# Patient Record
Sex: FEMALE | Race: WHITE | Employment: FULL TIME | URBAN - METROPOLITAN AREA
[De-identification: names, ages, dates, MRNs, and addresses within clinical notes are randomized per-mention and may not be internally consistent; named-entity substitution may affect disease eponyms.]

---

## 2017-08-03 ENCOUNTER — OFFICE VISIT (OUTPATIENT)
Dept: INTERNAL MEDICINE CLINIC | Age: 27
End: 2017-08-03

## 2017-08-03 VITALS
RESPIRATION RATE: 14 BRPM | HEART RATE: 77 BPM | TEMPERATURE: 98.7 F | BODY MASS INDEX: 30.66 KG/M2 | WEIGHT: 184 LBS | HEIGHT: 65 IN | SYSTOLIC BLOOD PRESSURE: 118 MMHG | DIASTOLIC BLOOD PRESSURE: 82 MMHG | OXYGEN SATURATION: 98 %

## 2017-08-03 DIAGNOSIS — F41.9 ANXIETY: Primary | ICD-10-CM

## 2017-08-03 DIAGNOSIS — Z79.899 ENCOUNTER FOR LONG-TERM (CURRENT) USE OF MEDICATIONS: ICD-10-CM

## 2017-08-03 PROBLEM — Z71.89 ADVANCE DIRECTIVE DISCUSSED WITH PATIENT: Chronic | Status: ACTIVE | Noted: 2017-08-03

## 2017-08-03 RX ORDER — SERTRALINE HYDROCHLORIDE 100 MG/1
100 TABLET, FILM COATED ORAL DAILY
Qty: 30 TAB | Refills: 5 | Status: SHIPPED | OUTPATIENT
Start: 2017-08-03 | End: 2018-02-21 | Stop reason: SDUPTHER

## 2017-08-03 RX ORDER — SERTRALINE HYDROCHLORIDE 50 MG/1
75 TABLET, FILM COATED ORAL DAILY
Qty: 45 TAB | Refills: 0 | Status: CANCELLED | OUTPATIENT
Start: 2017-08-03

## 2017-08-03 NOTE — PROGRESS NOTES
Chief Complaint   Patient presents with    Anxiety     follow up   [facility] Directive provided to pt today; will return to office upon completion. 1. Have you been to the ER, urgent care clinic since your last visit? Hospitalized since your last visit? No    2. Have you seen or consulted any other health care providers outside of the [address] [place] [address] since your last visit? Include any pap smears or colon screening. Yes, [facility].

## 2017-08-03 NOTE — ACP (ADVANCE CARE PLANNING)
-the [place] Advanced Directive for Healthcare template given to patient for review and completion. Patient asked to provide us with a copy once it is completed.

## 2017-08-03 NOTE — MR AVS SNAPSHOT
Visit Information Date & Time Provider Department Dept. Phone Encounter #  
 8/3/2017 12:00 PM [phone] Follow-up Instructions Return in about 2 weeks (around 8/17/2017). Upcoming Health Maintenance Date Due INFLUENZA AGE 9 TO ADULT 10/2/2017* DTaP/Tdap/Td series (2 - Td) 12/1/2019 PAP AKA CERVICAL CYTOLOGY 12/21/2019 *Topic was postponed. The date shown is not the original due date. Allergies as of 8/3/2017  Review Complete On: 8/3/2017 By: Shawna Garcia LPN No Known Allergies Current Immunizations  Never Reviewed Name Date Influenza Vaccine 11/21/2013 Tdap 12/1/2009 Not reviewed this visit You Were Diagnosed With   
  
 Codes Comments Anxiety     ICD-10-CM: F41.9 ICD-9-CM: 300.00 Advance directive discussed with patient     ICD-10-CM: Z71.89 ICD-9-CM: V65.49 Vitals BP Pulse Temp Resp Height(growth percentile) Weight(growth percentile) 118/82 (BP 1 Location: Left arm, BP Patient Position: Sitting) 77 98.7 °F (37.1 °C) (Oral) 14 5' 5\" (1.651 m) 184 lb (83.5 kg) LMP SpO2 Breastfeeding? BMI OB Status Smoking Status 07/26/2017 (Approximate) 98% No 30.62 kg/m2 Having regular periods Never Smoker Vitals History BMI and BSA Data Body Mass Index Body Surface Area  
 30.62 kg/m 2 1.96 m 2 [facility], [address] [place] [address] [phone] Your Updated Medication List  
  
   
This list is accurate as of: 8/3/17  1:20 PM.  Always use your most recent med list.  
  
  
  
  
 betamethasone dipropionate 0.05 % topical cream  
Commonly known as:  Noberto Mylar Apply  to affected area two (2) times a day. LEVORA-28 0.15-0.03 mg Tab Generic drug:  levonorgestrel-ethinyl estradiol TAKE ONE TABLET BY MOUTH EVERY DAY  
  
 sertraline 100 mg tablet Commonly known as:  ZOLOFT Take 1 Tab by mouth daily. Prescriptions Sent to Pharmacy Refills  
 sertraline (ZOLOFT) 100 mg tablet 5 Sig: Take 1 Tab by mouth daily. Class: Normal  
 Pharmacy: [facility] [address] [facility] #: [phone] Route: Oral  
  
Follow-up Instructions Return in about 2 weeks (around 8/17/2017). Introducing [facility] SERVICES! Dear Sue Reyes: 
Thank you for requesting a [email or web address] account. Our records indicate that you already have an active [email or web address] account. You can access your account anytime at [email or web address] Did you know that you can access your hospital and ER discharge instructions at any time in [email or web address]? You can also review all of your test results from your hospital stay or ER visit. Additional Information If you have questions, please visit the Frequently Asked Questions section of the [email or web address] website at [email or web address] Remember, [email or web address] is NOT to be used for urgent needs. For medical emergencies, dial 911. Now available from your iPhone and Android! Please provide this summary of care documentation to your next provider. Your primary care clinician is listed as Roseanne Mckenzie. If you have any questions after today's visit, please call [phone].

## 2017-08-03 NOTE — PROGRESS NOTES
Subjective:      Isabel Umanzor is a 28 y.o. female who presents today for follow up of her anxiety. She has been doing well on zoloft, her dose was increased from 50mg to 75mg daily in [address] [place] [address]. She states that her anxiety is in better control, but she still has some anhedonia. She is a  and she thought that without the stress of school, she would feel a little better. No significant change was noted over this summer. Patient Active Problem List   Diagnosis Code    Anxiety F41.9    Pap smear for cervical cancer screening Z12.4    Advance directive discussed with patient Z71.89     Current Outpatient Prescriptions   Medication Sig Dispense Refill    sertraline (ZOLOFT) 100 mg tablet Take 1 Tab by mouth daily. 30 Tab 5    LEVORA-28 0.15-0.03 mg tab TAKE ONE TABLET BY MOUTH EVERY DAY  2    betamethasone dipropionate (DIPROSONE) 0.05 % topical cream Apply  to affected area two (2) times a day. Review of Systems    Pertinent items are noted in HPI. Objective:     Visit Vitals    /82 (BP 1 Location: Left arm, BP Patient Position: Sitting)    Pulse 77    Temp 98.7 °F (37.1 °C) (Oral)    Resp 14    Ht 5' 5\" (1.651 m)    Wt 184 lb (83.5 kg)    LMP 07/26/2017 (Approximate)    SpO2 98%    Breastfeeding No    BMI 30.62 kg/m2     General appearance: alert, cooperative, no distress, appears stated age  Head: Normocephalic, without obvious abnormality, atraumatic  Neck: supple, symmetrical, trachea midline, no adenopathy, no carotid bruit and no JVD  Lungs: clear to auscultation bilaterally  Heart: regular rate and rhythm, S1, S2 normal, no murmur, click, rub or gallop  Neurologic: Mental status: Alert, oriented, thought content appropriate, affect: stable    Assessment/Plan:     1. Anxiety  -will increase her zololft from 75mg to 100mg daily. Orders Placed This Encounter    sertraline (ZOLOFT) 100 mg tablet     Sig: Take 1 Tab by mouth daily. Dispense:  30 Tab     Refill:  5        Follow-up Disposition:     Follow up in 2 weeks    Return if symptoms worsen or fail to improve. Advised patient to call back or return to office if symptoms worsen/change/persist.     Discussed expected course/resolution/complications of diagnosis in detail with patient. Medication risks/benefits/costs/interactions/alternatives discussed with patient. Patient was given an after visit summary which includes diagnoses, current medications, & vitals. Patient expressed understanding with the diagnosis and plan.

## 2017-08-21 ENCOUNTER — OFFICE VISIT (OUTPATIENT)
Dept: INTERNAL MEDICINE CLINIC | Age: 27
End: 2017-08-21

## 2017-08-21 VITALS
HEART RATE: 69 BPM | HEIGHT: 67 IN | OXYGEN SATURATION: 98 % | DIASTOLIC BLOOD PRESSURE: 80 MMHG | SYSTOLIC BLOOD PRESSURE: 120 MMHG | TEMPERATURE: 99 F | WEIGHT: 186 LBS | BODY MASS INDEX: 29.19 KG/M2

## 2017-08-21 DIAGNOSIS — Z79.899 ENCOUNTER FOR LONG-TERM (CURRENT) USE OF MEDICATIONS: ICD-10-CM

## 2017-08-21 DIAGNOSIS — F41.8 ANXIETY WITH DEPRESSION: Primary | ICD-10-CM

## 2017-08-21 RX ORDER — SERTRALINE HYDROCHLORIDE 50 MG/1
TABLET, FILM COATED ORAL
COMMUNITY
Start: 2017-07-17 | End: 2017-08-21 | Stop reason: DRUGHIGH

## 2017-08-21 NOTE — MR AVS SNAPSHOT
Visit Information Date & Time Provider Department Dept. Phone Encounter #  
 8/21/2017 11:00 AM MD Jaime [facility] [phone] Follow-up Instructions Return in about 4 months (around 12/21/2017). Upcoming Health Maintenance Date Due INFLUENZA AGE 9 TO ADULT 10/2/2017* DTaP/Tdap/Td series (2 - Td) 12/1/2019 PAP AKA CERVICAL CYTOLOGY 12/21/2019 *Topic was postponed. The date shown is not the original due date. Allergies as of 8/21/2017  Review Complete On: 8/21/2017 By: Eleni Raines LPN No Known Allergies Current Immunizations  Never Reviewed Name Date Influenza Vaccine 11/21/2013 Tdap 12/1/2009 Not reviewed this visit Vitals BP Pulse Temp Height(growth percentile) Weight(growth percentile) 120/80 (BP 1 Location: Left arm, BP Patient Position: Sitting) 69 99 °F (37.2 °C) (Oral) 5' 6.75\" (1.695 m) 186 lb (84.4 kg) LMP SpO2 BMI OB Status Smoking Status 07/26/2017 (Approximate) 98% 29.35 kg/m2 Having regular periods Never Smoker Vitals History BMI and BSA Data Body Mass Index Body Surface Area  
 29.35 kg/m 2 1.99 m 2 Preferred Pharmacy Pharmacy Name Phone Sarah Leonard [address] , [address] [place] [address] [phone] Your Updated Medication List  
  
   
This list is accurate as of: 8/21/17 11:34 AM.  Always use your most recent med list.  
  
  
  
  
 betamethasone dipropionate 0.05 % topical cream  
Commonly known as:  Alvenia Fede Apply  to affected area two (2) times a day. LEVORA-28 0.15-0.03 mg Tab Generic drug:  levonorgestrel-ethinyl estradiol TAKE ONE TABLET BY MOUTH EVERY DAY  
  
 sertraline 100 mg tablet Commonly known as:  ZOLOFT Take 1 Tab by mouth daily. Follow-up Instructions Return in about 4 months (around 12/21/2017). Introducing [facility] & HEALTH SERVICES! Dear Sue Reyes: Thank you for requesting a [employer] account. Our records indicate that you already have an active [employer] account. You can access your account anytime at [email or web address]. [email or web address] Did you know that you can access your hospital and ER discharge instructions at any time in [employer]? You can also review all of your test results from your hospital stay or ER visit. Additional Information If you have questions, please visit the Frequently Asked Questions section of the [employer] website at [email or web address] Remember, [employer] is NOT to be used for urgent needs. For medical emergencies, dial 911. Now available from your iPhone and Android! Please provide this summary of care documentation to your next provider. Your primary care clinician is listed as Roseanne Mckenzie. If you have any questions after today's visit, please call [phone].

## 2017-08-21 NOTE — PROGRESS NOTES
Subjective:      Deshawn Adams is a 28 y.o. female who presents today for follow up of her anxiety with depression. She states that she isn't sure if the dose increase of her zoloft from 75mg to 100mg daily has made a difference in the last 2 weeks since dose change. She has had some difficulties with sleep. Falls asleep without any problems, but will wake around midnight or 1 am and not able to fall back asleep. She is taking her zoloft in the morning. She is a  and is due back at school in one week. Patient Active Problem List   Diagnosis Code    Anxiety F41.9    Pap smear for cervical cancer screening Z12.4    Advance directive discussed with patient Z71.89     Current Outpatient Prescriptions   Medication Sig Dispense Refill    sertraline (ZOLOFT) 100 mg tablet Take 1 Tab by mouth daily. 30 Tab 5    LEVORA-28 0.15-0.03 mg tab TAKE ONE TABLET BY MOUTH EVERY DAY  2    betamethasone dipropionate (DIPROSONE) 0.05 % topical cream Apply  to affected area two (2) times a day. Review of Systems    Pertinent items are noted in HPI. Objective:     Visit Vitals    /80 (BP 1 Location: Left arm, BP Patient Position: Sitting)    Pulse 69    Temp 99 °F (37.2 °C) (Oral)    Ht 5' 6.75\" (1.695 m)    Wt 186 lb (84.4 kg)    LMP 07/26/2017 (Approximate)    SpO2 98%    BMI 29.35 kg/m2     General appearance: alert, cooperative, no distress, appears stated age  Neurologic: Mental status: Alert, oriented, thought content appropriate, affect: stable    Assessment/Plan:     1. Anxiety with depression  -since it has only been 2 weeks, will continue on zoloft 100mg daily. Her anxiety is under good control, she just still has some anhedonia.  -if in the next 3-4 weeks, she continues to not improve. Will change her medication at that time.  -she will keep me posted via [employer].     2. Encounter for long-term (current) use of medications       Follow-up Disposition:     Follow up 4 months      Return if symptoms worsen or fail to improve. Advised patient to call back or return to office if symptoms worsen/change/persist.     Discussed expected course/resolution/complications of diagnosis in detail with patient. Medication risks/benefits/costs/interactions/alternatives discussed with patient. Patient was given an after visit summary which includes diagnoses, current medications, & vitals. Patient expressed understanding with the diagnosis and plan.

## 2017-08-21 NOTE — PROGRESS NOTES
Chief Complaint   Patient presents with    Anxiety     2 week follow up     1. Have you been to the ER, urgent care clinic since your last visit? Hospitalized since your last visit? No    2. Have you seen or consulted any other health care providers outside of the [address] [place] [address] since your last visit? Include any pap smears or colon screening.  No

## 2018-02-26 ENCOUNTER — OFFICE VISIT (OUTPATIENT)
Dept: INTERNAL MEDICINE CLINIC | Age: 28
End: 2018-02-26

## 2018-02-26 VITALS
HEIGHT: 67 IN | WEIGHT: 187.4 LBS | BODY MASS INDEX: 29.41 KG/M2 | OXYGEN SATURATION: 97 % | SYSTOLIC BLOOD PRESSURE: 118 MMHG | TEMPERATURE: 99.3 F | DIASTOLIC BLOOD PRESSURE: 82 MMHG | HEART RATE: 67 BPM | RESPIRATION RATE: 15 BRPM

## 2018-02-26 DIAGNOSIS — Z79.899 ENCOUNTER FOR LONG-TERM (CURRENT) USE OF MEDICATIONS: ICD-10-CM

## 2018-02-26 DIAGNOSIS — F41.8 ANXIETY WITH DEPRESSION: Primary | ICD-10-CM

## 2018-02-26 NOTE — MR AVS SNAPSHOT
[facility], [facility] [address] [address] 
[phone] Patient: Woo Nicole MRN: [id number] :1990 Visit Information Date & Time Provider Department Dept. Phone Encounter #  
 2018 11:00 [facility] [phone] [id number] Follow-up Instructions Return in about 1 year (around 2019) for anxiety. Upcoming Health Maintenance Date Due DTaP/Tdap/Td series (2 - Td) 2019 PAP AKA CERVICAL CYTOLOGY 2019 Allergies as of 2018  Review Complete On: 2018 By: Raúl Thomas LPN No Known Allergies Current Immunizations  Never Reviewed Name Date Influenza Vaccine 2013 Tdap 2009 Not reviewed this visit You Were Diagnosed With   
  
 Codes Comments Anxiety with depression    -  Primary ICD-10-CM: F41.8 ICD-9-CM: 300.4 Encounter for long-term (current) use of medications     ICD-10-CM: Z79.899 ICD-9-CM: V58.69 Vitals BP Pulse Temp Resp Height(growth percentile) Weight(growth percentile) 118/82 (BP 1 Location: Left arm, BP Patient Position: Sitting) 67 99.3 °F (37.4 °C) (Oral) 15 5' 6.75\" (1.695 m) 187 lb 6.4 oz (85 kg) LMP SpO2 Breastfeeding? BMI OB Status Smoking Status 2018 (Exact Date) 97% No 29.57 kg/m2 Having regular periods Never Smoker Vitals History BMI and BSA Data Body Mass Index Body Surface Area  
 29.57 kg/m 2 2 m 2 Preferred Pharmacy Pharmacy Name Phone Wilmer Orta [address] [place] [address] [phone] Your Updated Medication List  
  
   
This list is accurate as of 18 11:38 AM.  Always use your most recent med list.  
  
  
  
  
 betamethasone dipropionate 0.05 % topical cream  
Commonly known as:  Lorita Donning Apply  to affected area two (2) times a day. LEVORA-28 0.15-0.03 mg Tab Generic drug:  levonorgestrel-ethinyl estradiol TAKE ONE TABLET BY MOUTH EVERY DAY  
  
 sertraline 100 mg tablet Commonly known as:  ZOLOFT  
TAKE ONE TABLET BY MOUTH DAILY Follow-up Instructions Return in about 1 year (around 2/26/2019) for anxiety. Introducing [facility] & HEALTH SERVICES! Dear Kalia North: 
Thank you for requesting a [employer] account. Our records indicate that you already have an active [employer] account. You can access your account anytime at [email or web address] Did you know that you can access your hospital and ER discharge instructions at any time in [employer]? You can also review all of your test results from your hospital stay or ER visit. Additional Information If you have questions, please visit the Frequently Asked Questions section of the [employer] website at [email or web address] Remember, [employer] is NOT to be used for urgent needs. For medical emergencies, dial 911. Now available from your iPhone and Android! Please provide this summary of care documentation to your next provider. Your primary care clinician is listed as Roseanne Mckenzie. If you have any questions after today's visit, please call [phone].

## 2018-02-26 NOTE — PROGRESS NOTES
Chief Complaint   Patient presents with    Anxiety     6 month follow up     1. Have you been to the ER, urgent care clinic since your last visit? Hospitalized since your last visit? No    2. Have you seen or consulted any other health care providers outside of the [address] [place] [address] since your last visit? Include any pap smears or colon screening.  [facility] 12/2017

## 2018-02-27 NOTE — PROGRESS NOTES
Subjective:      Conception Raul is a 28 y.o. female who presents today for follow up of her anxiety. She is taking zoloft 100mg daily. States that she is doing quite well. Work is good. Her daughter is doing well. Patient Active Problem List   Diagnosis Code    Anxiety F41.9    Pap smear for cervical cancer screening Z12.4    Advance directive discussed with patient Z71.89     Current Outpatient Prescriptions   Medication Sig Dispense Refill    sertraline (ZOLOFT) 100 mg tablet TAKE ONE TABLET BY MOUTH DAILY 30 Tab 0    LEVORA-28 0.15-0.03 mg tab TAKE ONE TABLET BY MOUTH EVERY DAY  2    betamethasone dipropionate (DIPROSONE) 0.05 % topical cream Apply  to affected area two (2) times a day. Review of Systems    Pertinent items are noted in HPI. Objective:     Visit Vitals    /82 (BP 1 Location: Left arm, BP Patient Position: Sitting)    Pulse 67    Temp 99.3 °F (37.4 °C) (Oral)    Resp 15    Ht 5' 6.75\" (1.695 m)    Wt 187 lb 6.4 oz (85 kg)    LMP 02/21/2018 (Exact Date)    SpO2 97%    Breastfeeding No    BMI 29.57 kg/m2     General appearance: alert, cooperative, no distress, appears stated age  Head: Normocephalic, without obvious abnormality, atraumatic  Lungs: clear to auscultation bilaterally  Heart: regular rate and rhythm, S1, S2 normal, no murmur, click, rub or gallop  Neurologic: Mental status: Alert, oriented, thought content appropriate, affect: stable and normal    Assessment/Plan:     1. Anxiety with depression  -I evaluated and recommended to continue current doses of medications.   -consider weaning next spring. 2. Encounter for long-term (current) use of medications       Follow-up Disposition:     Follow up in 1 month year. Return if symptoms worsen or fail to improve. Advised patient to call back or return to office if symptoms worsen/change/persist.     Discussed expected course/resolution/complications of diagnosis in detail with patient. Medication risks/benefits/costs/interactions/alternatives discussed with patient. Patient was given an after visit summary which includes diagnoses, current medications, & vitals. Patient expressed understanding with the diagnosis and plan.

## 2018-11-26 ENCOUNTER — OFFICE VISIT (OUTPATIENT)
Dept: INTERNAL MEDICINE CLINIC | Age: 28
End: 2018-11-26

## 2018-11-26 VITALS
WEIGHT: 196 LBS | BODY MASS INDEX: 30.76 KG/M2 | OXYGEN SATURATION: 98 % | TEMPERATURE: 98.1 F | HEIGHT: 67 IN | RESPIRATION RATE: 15 BRPM | SYSTOLIC BLOOD PRESSURE: 108 MMHG | HEART RATE: 62 BPM | DIASTOLIC BLOOD PRESSURE: 60 MMHG

## 2018-11-26 DIAGNOSIS — F41.8 ANXIETY WITH DEPRESSION: Primary | ICD-10-CM

## 2018-11-26 DIAGNOSIS — Z79.899 ENCOUNTER FOR LONG-TERM (CURRENT) USE OF MEDICATIONS: ICD-10-CM

## 2018-11-26 RX ORDER — CITALOPRAM 20 MG/1
20 TABLET, FILM COATED ORAL DAILY
Qty: 30 TAB | Refills: 1 | Status: SHIPPED | OUTPATIENT
Start: 2018-11-26 | End: 2019-01-24 | Stop reason: SDUPTHER

## 2018-11-26 RX ORDER — SERTRALINE HYDROCHLORIDE 25 MG/1
25 TABLET, FILM COATED ORAL DAILY
Qty: 7 TAB | Refills: 0 | Status: SHIPPED | OUTPATIENT
Start: 2018-11-26 | End: 2018-12-27 | Stop reason: ALTCHOICE

## 2018-11-26 NOTE — PROGRESS NOTES
Patient's identity verified with two patient identifiers (name and date of birth). Reviewed record in preparation for visit and have obtained necessary documentation. 1. Have you been to the ER, urgent care clinic since your last visit? Hospitalized since your last visit? No 
2. Have you seen or consulted any other health care providers outside of the [address] [place] [address] since your last visit? Include any pap smears or colon screening. No 
 
Chief Complaint Patient presents with  Medication Evaluation Requesting refill of Zoloft, but states \"it's not working\", would like to talk about alternatives. Has increased dose everytime she comes in & declines doing that today. Has been on Celexa in the past before pregnancy x4yrs ago. Not fasting. Health Maintenance Due Topic Date Due  Influenza Age 5 to Adult Done per pt, x2wks ago. 08/01/2018 Wt Readings from Last 3 Encounters:  
11/26/18 196 lb (88.9 kg) 02/26/18 187 lb 6.4 oz (85 kg) 08/21/17 186 lb (84.4 kg) Temp Readings from Last 3 Encounters:  
11/26/18 98.1 °F (36.7 °C) (Oral) 02/26/18 99.3 °F (37.4 °C) (Oral) 08/21/17 99 °F (37.2 °C) (Oral) BP Readings from Last 3 Encounters:  
11/26/18 108/60  
02/26/18 118/82  
08/21/17 120/80 Pulse Readings from Last 3 Encounters:  
11/26/18 62  
02/26/18 67  
08/21/17 69

## 2018-11-26 NOTE — PROGRESS NOTES
Subjective:  
  
Sherryle Hutchinson is a 33 y.o. female who presents today for follow up of her anxiety. She states that she doesn't feel the zoloft is working any more. She is having some episodes of feeling sad. She is currently in a job that she does not like. 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 She has used celexa in the past - high school until she became pregnant , when she was switched to zoloft post partum. She and her  are thinking about conceiving again as well. Patient Active Problem List  
Diagnosis Code  Anxiety F41.9  Pap smear for cervical cancer screening Z12.4  Advance directive discussed with patient Z70.80 Current Outpatient Medications Medication Sig Dispense Refill  sertraline (ZOLOFT) 25 mg tablet Take 1 Tab by mouth daily. 7 Tab 0  
 citalopram (CELEXA) 20 mg tablet Take 1 Tab by mouth daily. 30 Tab 1  LEVORA-28 0.15-0.03 mg tab TAKE ONE TABLET BY MOUTH EVERY DAY  2  
 betamethasone dipropionate (DIPROSONE) 0.05 % topical cream Apply  to affected area two (2) times a day. Review of Systems Pertinent items are noted in HPI. Objective:  
 
Visit Vitals /60 (BP 1 Location: Left arm, BP Patient Position: Sitting) Pulse 62 Temp 98.1 °F (36.7 °C) (Oral) Resp 15 Ht 5' 6.75\" (1.695 m) Wt 196 lb (88.9 kg) LMP 11/05/2018 (Within Days) SpO2 98% Breastfeeding? No  
BMI 30.93 kg/m² General appearance: alert, cooperative, no distress, appears stated age Head: Normocephalic, without obvious abnormality, atraumatic Neck: supple, symmetrical, trachea midline, no adenopathy, no carotid bruit and no JVD Lungs: clear to auscultation bilaterally Heart: regular rate and rhythm, S1, S2 normal, no murmur, click, rub or gallop Neurologic: Mental status: Alert, oriented, thought content appropriate, affect: stable and normal 
 
Assessment/Plan: 1. Anxiety with depression -will wean her off of her zoloft at this time.  
-titrate up celexa to 20mg daily. 
-instructions given to patient. 
-recommended counseling to help her cope with a job that she does not like that she has to stay in for the next 7 months. 2. Encounter for long-term (current) use of medications Orders Placed This Encounter  sertraline (ZOLOFT) 25 mg tablet Sig: Take 1 Tab by mouth daily. Dispense:  7 Tab Refill:  0  
 citalopram (CELEXA) 20 mg tablet Sig: Take 1 Tab by mouth daily. Dispense:  30 Tab Refill:  1 Follow-up Disposition:  
 
follow up 4 weeks. Return if symptoms worsen or fail to improve. Advised patient to call back or return to office if symptoms worsen/change/persist.  
 
Discussed expected course/resolution/complications of diagnosis in detail with patient. Medication risks/benefits/costs/interactions/alternatives discussed with patient. Patient was given an after visit summary which includes diagnoses, current medications, & vitals. Patient expressed understanding with the diagnosis and plan.

## 2018-11-26 NOTE — PATIENT INSTRUCTIONS
Days 1-7 zoloft 50mg in the AM 
                celexa 10mg in the evening Day 8-14  Zoloft 25 mg in the morning Celexa 20mg in the evening Day 15-21  Stop the zoloft Celexa 20mg daily.

## 2018-12-27 ENCOUNTER — OFFICE VISIT (OUTPATIENT)
Dept: INTERNAL MEDICINE CLINIC | Age: 28
End: 2018-12-27

## 2018-12-27 VITALS
DIASTOLIC BLOOD PRESSURE: 78 MMHG | HEART RATE: 75 BPM | HEIGHT: 67 IN | WEIGHT: 199 LBS | OXYGEN SATURATION: 98 % | SYSTOLIC BLOOD PRESSURE: 112 MMHG | RESPIRATION RATE: 15 BRPM | BODY MASS INDEX: 31.23 KG/M2 | TEMPERATURE: 99.2 F

## 2018-12-27 DIAGNOSIS — F41.8 DEPRESSION WITH ANXIETY: Primary | ICD-10-CM

## 2018-12-27 DIAGNOSIS — Z79.899 ENCOUNTER FOR LONG-TERM (CURRENT) USE OF MEDICATIONS: ICD-10-CM

## 2018-12-27 RX ORDER — BUPROPION HYDROCHLORIDE 150 MG/1
150 TABLET ORAL
Qty: 30 TAB | Refills: 0 | Status: SHIPPED | OUTPATIENT
Start: 2018-12-27 | End: 2019-01-24 | Stop reason: SDUPTHER

## 2018-12-27 NOTE — PROGRESS NOTES
Patient's identity verified with two patient identifiers (name and date of birth). Reviewed record in preparation for visit and have obtained necessary documentation. 1. Have you been to the ER, urgent care clinic since your last visit? Hospitalized since your last visit? No  2. Have you seen or consulted any other health care providers outside of the [address] [place] [address] since your last visit? Include any pap smears or colon screening. No    Chief Complaint   Patient presents with    Depression     4wk follow up, stopped Zoloft, started Celexa. Doesn't feel like the Celexa is working, has been on x2wks, takes in AM.  Complains of feeling \"jittery & tense, on edge\". States  thinks she is more fatigued, she feels rested. Not fasting. There are no preventive care reminders to display for this patient.     Wt Readings from Last 3 Encounters:   12/27/18 199 lb (90.3 kg)   11/26/18 196 lb (88.9 kg)   02/26/18 187 lb 6.4 oz (85 kg)     Temp Readings from Last 3 Encounters:   12/27/18 99.2 °F (37.3 °C) (Oral)   11/26/18 98.1 °F (36.7 °C) (Oral)   02/26/18 99.3 °F (37.4 °C) (Oral)     BP Readings from Last 3 Encounters:   12/27/18 112/78   11/26/18 108/60   02/26/18 118/82     Pulse Readings from Last 3 Encounters:   12/27/18 75   11/26/18 62   02/26/18 67

## 2018-12-27 NOTE — PROGRESS NOTES
Subjective:      Sofi Pérez is a 33 y.o. female who presents today for follow up of her depression with anxiety. She was weaned off of zoloft which she felt was no longer effective and started on celexa 20mg daily 1 month ago. She is still feeling gould, edgy and her anxiety is not well controlled. She has been on celexa on its own for 2 weeks now. Her mother is taking wellbutrin for depression without anxiety and it works well for her. Patient Active Problem List   Diagnosis Code    Anxiety F41.9    Pap smear for cervical cancer screening Z12.4    Advance directive discussed with patient Z71.89     Current Outpatient Medications   Medication Sig Dispense Refill    buPROPion XL (WELLBUTRIN XL) 150 mg tablet Take 1 Tab by mouth every morning. 30 Tab 0    citalopram (CELEXA) 20 mg tablet Take 1 Tab by mouth daily. 30 Tab 1    LEVORA-28 0.15-0.03 mg tab TAKE ONE TABLET BY MOUTH EVERY DAY  2    betamethasone dipropionate (DIPROSONE) 0.05 % topical cream Apply  to affected area two (2) times a day. Review of Systems    Pertinent items are noted in HPI. Objective:     Visit Vitals  /78 (BP 1 Location: Left arm, BP Patient Position: Sitting)   Pulse 75   Temp 99.2 °F (37.3 °C) (Oral)   Resp 15   Ht 5' 6.75\" (1.695 m)   Wt 199 lb (90.3 kg)   LMP 12/12/2018 (Within Days)   SpO2 98%   Breastfeeding? No   BMI 31.40 kg/m²     General appearance: alert, cooperative, no distress, appears stated age  Head: Normocephalic, without obvious abnormality, atraumatic  Lungs: clear to auscultation bilaterally  Heart: regular rate and rhythm, S1, S2 normal, no murmur, click, rub or gallop  Neurologic: Mental status: Alert, oriented, thought content appropriate, affect: stable and normal    Assessment/Plan:     1. Depression with anxiety  -discussed with patient that I will  add wellbutrin to her celexa to help control her symptoms.      2. Encounter for long-term (current) use of medications    Orders Placed This Encounter    buPROPion XL (WELLBUTRIN XL) 150 mg tablet     Sig: Take 1 Tab by mouth every morning. Dispense:  30 Tab     Refill:  0         Follow-up Disposition:     follow up 2-3 weeks. Return if symptoms worsen or fail to improve. Advised patient to call back or return to office if symptoms worsen/change/persist.     Discussed expected course/resolution/complications of diagnosis in detail with patient. Medication risks/benefits/costs/interactions/alternatives discussed with patient. Patient was given an after visit summary which includes diagnoses, current medications, & vitals. Patient expressed understanding with the diagnosis and plan.

## 2019-01-11 ENCOUNTER — OFFICE VISIT (OUTPATIENT)
Dept: INTERNAL MEDICINE CLINIC | Age: 29
End: 2019-01-11

## 2019-01-11 VITALS
BODY MASS INDEX: 31.2 KG/M2 | WEIGHT: 198.8 LBS | SYSTOLIC BLOOD PRESSURE: 100 MMHG | RESPIRATION RATE: 18 BRPM | DIASTOLIC BLOOD PRESSURE: 68 MMHG | HEIGHT: 67 IN | HEART RATE: 63 BPM | OXYGEN SATURATION: 98 % | TEMPERATURE: 98.7 F

## 2019-01-11 DIAGNOSIS — Z79.899 ENCOUNTER FOR LONG-TERM (CURRENT) USE OF MEDICATIONS: ICD-10-CM

## 2019-01-11 DIAGNOSIS — R53.83 FATIGUE, UNSPECIFIED TYPE: ICD-10-CM

## 2019-01-11 DIAGNOSIS — F41.8 DEPRESSION WITH ANXIETY: Primary | ICD-10-CM

## 2019-01-11 NOTE — PROGRESS NOTES
Reviewed record in preparation for visit and have obtained necessary documentation. Identified pt with two pt identifiers(name and ). There are no preventive care reminders to display for this patient. Chief Complaint Patient presents with  Depression  Medication Evaluation 2 wk f/u Wt Readings from Last 3 Encounters:  
19 198 lb 12.8 oz (90.2 kg) 18 199 lb (90.3 kg)  
18 196 lb (88.9 kg) Temp Readings from Last 3 Encounters:  
18 99.2 °F (37.3 °C) (Oral)  
18 98.1 °F (36.7 °C) (Oral) 18 99.3 °F (37.4 °C) (Oral) BP Readings from Last 3 Encounters:  
18 112/78  
18 108/60  
18 118/82 Pulse Readings from Last 3 Encounters:  
18 75  
18 62  
18 67 Learning Assessment: 
:  
 
Learning Assessment 2019 2018 8/3/2017 2015 12/15/2014 2014 PRIMARY LEARNER Patient Patient Patient Patient Patient Patient HIGHEST LEVEL OF EDUCATION - PRIMARY LEARNER  > 4 YEARS OF COLLEGE > 4 YEARS OF COLLEGE 4 YEARS OF COLLEGE > 4 YEARS OF COLLEGE > 4 YEARS OF COLLEGE > 4 YEARS OF COLLEGE  
BARRIERS PRIMARY LEARNER NONE NONE NONE NONE NONE NONE  
CO-LEARNER CAREGIVER - No - No No No  
PRIMARY LANGUAGE ENGLISH ENGLISH ENGLISH ENGLISH ENGLISH ENGLISH  NEED - - - - No No  
LEARNER PREFERENCE PRIMARY DEMONSTRATION READING READING DEMONSTRATION VIDEOS DEMONSTRATION  
LEARNING SPECIAL TOPICS - - - - no no ANSWERED BY patient patient self self patient patient RELATIONSHIP SELF SELF SELF SELF SELF SELF Depression Screening: 
:  
 
PHQ over the last two weeks 2019 PHQ Not Done - Little interest or pleasure in doing things Several days Feeling down, depressed, irritable, or hopeless More than half the days Total Score PHQ 2 3 Fall Risk Assessment: 
:  
 
Fall Risk Assessment, last 12 mths 2019 Able to walk? Yes Fall in past 12 months?  No  
 
 
 Abuse Screening: 
:  
 
Abuse Screening Questionnaire 1/11/2019 11/26/2018 8/21/2017 8/3/2017 12/7/2015 12/15/2014 Do you ever feel afraid of your partner? N N N N N N Are you in a relationship with someone who physically or mentally threatens you? N N N N N N Is it safe for you to go home? Jason Malloy Coordination of Care Questionnaire: 
:  
 
1) Have you been to an emergency room, urgent care clinic since your last visit? no  
Hospitalized since your last visit? no          
 
2) Have you seen or consulted any other health care providers outside of [address] [place] [address] since your last visit? no  (Include any pap smears or colon screenings in this section.) 3) Do you have an Advance Directive on file? no 
 
4) Are you interested in receiving information on Advance Directives? NO Patient is accompanied by self I have received verbal consent from Aline Valera to discuss any/all medical information while they are present in the room.

## 2019-01-11 NOTE — PROGRESS NOTES
Subjective:  
  
La Rojas is a 33 y.o. female who presents today for follow up of her depression with anxiety. wellbutrin was started 1 month ago in addition to her celexa. She was still having breakthrough depression at that time. She states that she may be a little better, less mood swing. She continues to feel very tired. She has always needed \"a lot\" of sleep per patient, but she can go to bed at 8:30p and sleep until morning. She is tired by early afternoon. She has always been a morning person, but by early afternoon she is tired. She snores occasionally. Patient Active Problem List  
Diagnosis Code  Anxiety F41.9  Pap smear for cervical cancer screening Z12.4  Advance directive discussed with patient Z70.80 Current Outpatient Medications Medication Sig Dispense Refill  buPROPion XL (WELLBUTRIN XL) 150 mg tablet Take 1 Tab by mouth every morning. 30 Tab 0  
 citalopram (CELEXA) 20 mg tablet Take 1 Tab by mouth daily. 30 Tab 1  LEVORA-28 0.15-0.03 mg tab TAKE ONE TABLET BY MOUTH EVERY DAY  2  
 betamethasone dipropionate (DIPROSONE) 0.05 % topical cream Apply  to affected area two (2) times a day. Review of Systems Pertinent items are noted in HPI. Objective:  
 
Visit Vitals /68 (BP 1 Location: Left arm, BP Patient Position: Sitting) Pulse 63 Temp 98.7 °F (37.1 °C) (Oral) Resp 18 Ht 5' 6.5\" (1.689 m) Wt 198 lb 12.8 oz (90.2 kg) LMP 12/12/2018 (Within Days) SpO2 98% BMI 31.61 kg/m² General appearance: alert, cooperative, no distress, appears stated age Head: Normocephalic, without obvious abnormality, atraumatic Neurologic: Mental status: Alert, oriented, thought content appropriate, affect: stable and normal 
 
Assessment/Plan: ICD-10-CM ICD-9-CM 1. Depression with anxiety F41.8 300.4 TSH 3RD GENERATION  
   T4, FREE 2.  Encounter for long-term (current) use of medications Z79.899 V58.69 CBC WITH AUTOMATED DIFF  
   METABOLIC PANEL, COMPREHENSIVE  
   HEMOGLOBIN A1C WITH EAG  
   TSH 3RD GENERATION  
   T4, FREE 3. Fatigue, unspecified type R53.83 780.79 CBC WITH AUTOMATED DIFF  
   METABOLIC PANEL, COMPREHENSIVE  
   HEMOGLOBIN A1C WITH EAG  
   TSH 3RD GENERATION  
   T4, FREE  
   SLEEP MEDICINE REFERRAL Plan: 
-check labs 
-discussed with patient that sleep disturbance can contribute to daily fatigue and her depression and anxiety. -recommended sleep study. Follow-up Disposition:  
 
Follow up in 6 months Return if symptoms worsen or fail to improve. Advised patient to call back or return to office if symptoms worsen/change/persist.  
 
Discussed expected course/resolution/complications of diagnosis in detail with patient. Medication risks/benefits/costs/interactions/alternatives discussed with patient. Patient was given an after visit summary which includes diagnoses, current medications, & vitals. Patient expressed understanding with the diagnosis and plan.

## 2019-01-16 ENCOUNTER — PATIENT MESSAGE (OUTPATIENT)
Dept: INTERNAL MEDICINE CLINIC | Age: 29
End: 2019-01-16

## 2019-01-16 NOTE — TELEPHONE ENCOUNTER
From: Angella Overton  To: Jane Kwok MD  Sent: 1/16/2019 2:38 PM EST  Subject: Visit Follow-Up Question    I haven't heard anything from the practice that deals with the sleep apnea stuff. I know you said to reach out if I didn't hear from them by mid-week, so was wondering if you could follow up on that for me or give me their contact info and I can reach out to them?

## 2019-01-16 NOTE — TELEPHONE ENCOUNTER
From  Arnel Macario MD To  Sarita Kunz Sent  1/16/2019  4:06 PM   I referred her to Dr. Sarai Tinsley you just give them a call and see if they got the referral.     Thanks,   Richard Farah   Physician   Specialty   Sleep Medicine     Phone Fax E-mail Address   [phone] Not available [address] [facility] [address]     Call to Dr. Catrina Garcia office but was on hold x2 for x9mins each with no answer. Information provided to patient to phone office for appointment.

## 2019-04-25 ENCOUNTER — OFFICE VISIT (OUTPATIENT)
Dept: SLEEP MEDICINE | Age: 29
End: 2019-04-25

## 2019-04-25 VITALS
BODY MASS INDEX: 30.69 KG/M2 | SYSTOLIC BLOOD PRESSURE: 100 MMHG | HEART RATE: 77 BPM | OXYGEN SATURATION: 97 % | DIASTOLIC BLOOD PRESSURE: 70 MMHG | WEIGHT: 195.5 LBS | HEIGHT: 67 IN

## 2019-04-25 DIAGNOSIS — G47.33 OSA (OBSTRUCTIVE SLEEP APNEA): Primary | ICD-10-CM

## 2019-04-25 DIAGNOSIS — Z86.59 H/O ANXIETY DISORDER: ICD-10-CM

## 2019-04-25 NOTE — PATIENT INSTRUCTIONS
[address] [facility] [place]. [place], [address]  Tel.  [phone]  Fax. [address] [facility] [address]  [place], [address] [facility]  Tel.  [phone]  Fax. [phone] [address]  Tel.  [phone]  Fax. [phone]     Sleep Apnea: After Your Visit  Your Care Instructions  Sleep apnea occurs when you frequently stop breathing for 10 seconds or longer during sleep. It can be mild to severe, based on the number of times per hour that you stop breathing or have slowed breathing. Blocked or narrowed airways in your nose, mouth, or throat can cause sleep apnea. Your airway can become blocked when your throat muscles and tongue relax during sleep. Sleep apnea is common, occurring in 1 out of 20 individuals. Individuals having any of the following characteristics should be evaluated and treated right away due to high risk and detrimental consequences from untreated sleep apnea:  1. Obesity  2. Congestive Heart failure  3. Atrial Fibrillation  4. Uncontrolled Hypertension  5. Type II Diabetes  6. Night-time Arrhythmias  7. Stroke  8. Pulmonary Hypertension  9. High-risk Driving Populations (pilots, truck drivers, etc.)  10. Patients Considering Weight-loss Surgery    How do you know you have sleep apnea? You probably have sleep apnea if you answer 'yes' to 3 or more of the following questions:  S - Have you been told that you Snore? T - Are you often Tired during the day? O - Has anyone Observed you stop breathing while sleeping? P- Do you have (or are being treated for) high blood Pressure? B - Are you obese (Body Mass Index > 35)? A - Is your Age 48years old or older? N - Is your Neck size greater than 16 inches? G - Are you male Gender? A sleep physician can prescribe a breathing device that prevents tissues in the throat from blocking your airway.  Or your doctor may recommend using a dental device (oral breathing device) to help keep your airway open. In some cases, surgery may be needed to remove enlarged tissues in the throat. Follow-up care is a key part of your treatment and safety. Be sure to make and go to all appointments, and call your doctor if you are having problems. It's also a good idea to know your test results and keep a list of the medicines you take. How can you care for yourself at home? · Lose weight, if needed. It may reduce the number of times you stop breathing or have slowed breathing. · Go to bed at the same time every night. · Sleep on your side. It may stop mild apnea. If you tend to roll onto your back, sew a pocket in the back of your pajama top. Put a tennis ball into the pocket, and stitch the pocket shut. This will help keep you from sleeping on your back. · Avoid alcohol and medicines such as sleeping pills and sedatives before bed. · Do not smoke. Smoking can make sleep apnea worse. If you need help quitting, talk to your doctor about stop-smoking programs and medicines. These can increase your chances of quitting for good. · Prop up the head of your bed 4 to 6 inches by putting bricks under the legs of the bed. · Treat breathing problems, such as a stuffy nose, caused by a cold or allergies. · Use a continuous positive airway pressure (CPAP) breathing machine if lifestyle changes do not help your apnea and your doctor recommends it. The machine keeps your airway from closing when you sleep. · If CPAP does not help you, ask your doctor whether you should try other breathing machines. A bilevel positive airway pressure machine has two types of air pressureâone for breathing in and one for breathing out. Another device raises or lowers air pressure as needed while you breathe. · If your nose feels dry or bleeds when using one of these machines, talk with your doctor about increasing moisture in the air. A humidifier may help.   · If your nose is runny or stuffy from using a breathing machine, talk with your doctor about using decongestants or a corticosteroid nasal spray. When should you call for help? Watch closely for changes in your health, and be sure to contact your doctor if:  · You still have sleep apnea even though you have made lifestyle changes. · You are thinking of trying a device such as CPAP. · You are having problems using a CPAP or similar machine. Where can you learn more? Go to [employer]. Enter C449 in the search box to learn more about \"Sleep Apnea: After Your Visit. \"   © 0313-8834 Healthwise, Incorporated. Care instructions adapted under license by [facility] (which disclaims liability or warranty for this information). This care instruction is for use with your licensed healthcare professional. If you have questions about a medical condition or this instruction, always ask your healthcare professional. Clair Larsen any warranty or liability for your use of this information. PROPER SLEEP HYGIENE    What to avoid  · Do not have drinks with caffeine, such as coffee or black tea, for 8 hours before bed. · Do not smoke or use other types of tobacco near bedtime. Nicotine is a stimulant and can keep you awake. · Avoid drinking alcohol late in the evening, because it can cause you to wake in the middle of the night. · Do not eat a big meal close to bedtime. If you are hungry, eat a light snack. · Do not drink a lot of water close to bedtime, because the need to urinate may wake you up during the night. · Do not read or watch TV in bed. Use the bed only for sleeping and sexual activity. What to try  · Go to bed at the same time every night, and wake up at the same time every morning. Do not take naps during the day. · Keep your bedroom quiet, dark, and cool. · Get regular exercise, but not within 3 to 4 hours of your bedtime. .  · Sleep on a comfortable pillow and mattress.   · If watching the clock makes you anxious, turn it facing away from you so you cannot see the time. · If you worry when you lie down, start a worry book. Well before bedtime, write down your worries, and then set the book and your concerns aside. · Try meditation or other relaxation techniques before you go to bed. · If you cannot fall asleep, get up and go to another room until you feel sleepy. Do something relaxing. Repeat your bedtime routine before you go to bed again. · Make your house quiet and calm about an hour before bedtime. Turn down the lights, turn off the TV, log off the computer, and turn down the volume on music. This can help you relax after a busy day. Drowsy Driving  The [address] [place] [address] Road Traffic Safety Administration cites drowsiness as a causing factor in more than 944,539 police reported crashes annually, resulting in 76,000 injuries and 1,500 deaths. Other surveys suggest 55% of people polled have driven while drowsy in the past year, 23% had fallen asleep but not crashed, 3% crashed, and 2% had and accident due to drowsy driving. Who is at risk? Young Drivers: One study of drowsy driving accidents states that 55% of the drivers were under 25 years. Of those, 75% were male. Shift Workers and Travelers: People who work overnight or travel across time zones frequently are at higher risk of experiencing Circadian Rhythm Disorders. They are trying to work and function when their body is programed to sleep. Sleep Deprived: Lack of sleep has a serious impact on your ability to pay attention or focus on a task. Consistently getting less than the average of 8 hours your body needs creates partial or cumulative sleep deprivation. Untreated Sleep Disorders: Sleep Apnea, Narcolepsy, R.L.S., and other sleep disorders (untreated) prevent a person from getting enough restful sleep. This leads to excessive daytime sleepiness and increases the risk for drowsy driving accidents by up to 7 times.   Medications / Alcohol: Even over the counter medications can cause drowsiness. Medications that impair a drivers attention should have a warning label. Alcohol naturally makes you sleepy and on its own can cause accidents. Combined with excessive drowsiness its effects are amplified. Signs of Drowsy Driving:   * You don't remember driving the last few miles   * You may drift out of your rich   * You are unable to focus and your thoughts wander   * You may yawn more often than normal   * You have difficulty keeping your eyes open / nodding off   * Missing traffic signs, speeding, or tailgating  Prevention-   Good sleep hygiene, lifestyle and behavioral choices have the most impact on drowsy driving. There is no substitute for sleep and the average person requires 8 hours nightly. If you find yourself driving drowsy, stop and sleep. Consider the sleep hygiene tips provided during your visit as well. Medication Refill Policy: Refills for all medications require 1 week advance notice. Please have your pharmacy fax a refill request. We are unable to fax, or call in \"controled substance\" medications and you will need to pick these prescriptions up from our office. [email or web address] Activation    Thank you for requesting access to [email or web address]. Please follow the instructions below to securely access and download your online medical record. [email or web address] allows you to send messages to your doctor, view your test results, renew your prescriptions, schedule appointments, and more. How Do I Sign Up? 1. In your internet browser, go to [email or web address]. 2. Click on the First Time User? Click Here link in the Sign In box. You will see the New Member Sign Up page. 3. Enter your [email or web address] Access Code exactly as it appears below. You will not need to use this code after youve completed the sign-up process. If you do not sign up before the expiration date, you must request a new code. [email or web address] Access Code:  Activation code not generated  Current [email or web address] Status: Active (This is the date your Personal access code will )    4. Enter the last four digits of your Social Security Number (xxxx) and Date of Birth (mm/dd/yyyy) as indicated and click Submit. You will be taken to the next sign-up page. 5. Create a [employer]At ID. This will be your Personal login ID and cannot be changed, so think of one that is secure and easy to remember. 6. Create a Personal password. You can change your password at any time. 7. Enter your Password Reset Question and Answer. This can be used at a later time if you forget your password. 8. Enter your e-mail address. You will receive e-mail notification when new information is available in [employer]. 9. Click Sign Up. You can now view and download portions of your medical record. 10. Click the Download Summary menu link to download a portable copy of your medical information. Additional Information    If you have questions, please call [phone]. Remember, Personal is NOT to be used for urgent needs. For medical emergencies, dial 911.

## 2019-04-25 NOTE — PROGRESS NOTES
217 [facility] [place]. [place], [address]  Tel.  [phone]  Fax. 100 [facility] 60  [place], [address] [facility]  Tel.  [phone]  Fax. [phone] [address]   Tel.  [phone]  Fax. [phone]         Subjective:      Jane Reyna is an 26 y.o. female referred for evaluation for a sleep disorder. She complains of snoring associated with awakening in the middle of the night because of urination and waking up feeling tired. Symptoms began 4 years ago, gradually worsening since that time. She usually can fall asleep in 5 minutes. Family or house members note snoring. She denies completely or partially paralyzed while falling asleep or waking up. Jane Reyna does wake up frequently at night. She is bothered by waking up too early and left unable to get back to sleep. She actually sleeps about 9 hours at night and wakes up about 6 times during the night. She does not work shifts:  .   Alena Barton indicates she does not get too little sleep at night. Her bedtime is 2030. She awakens at 0500. She does take naps. She takes 2 naps a week lasting 2, Hour(s). She has the following observed behaviors: Loud snoring, Light snoring, Sleep talking, Getting out of the bed while still asleep, Grinding teeth;  . Other remarks:   She denies of symptoms indicative os cataplexy, sleep paralysis or hypnagogic hallucinations. [place] Sleepiness Score: 7 which reflect mild daytime drowsiness.     No Known Allergies      Current Outpatient Medications:     buPROPion XL (WELLBUTRIN XL) 150 mg tablet, TAKE ONE TABLET BY MOUTH EVERY MORNING, Disp: 30 Tab, Rfl: 5    citalopram (CELEXA) 20 mg tablet, TAKE ONE TABLET BY MOUTH DAILY, Disp: 30 Tab, Rfl: 5    LEVORA-28 0.15-0.03 mg tab, TAKE ONE TABLET BY MOUTH EVERY DAY, Disp: , Rfl: 2    betamethasone dipropionate (DIPROSONE) 0.05 % topical cream, Apply  to affected area two (2) times a day., Disp: , Rfl: She  has a past medical history of Anxiety, Contact dermatitis and other eczema, due to unspecified cause, and Psychiatric problem. She also has no past medical history of Abnormal Papanicolaou smear of cervix, Anemia, Asthma, Breast disorder, Chlamydia, Complication of anesthesia, Diabetes ([address] Disease of blood and blood forming organ, Epilepsy ([address] Essential hypertension, Genital herpes, Gonorrhea, Heart abnormality, Herpes gestationis, Herpes simplex virus (HSV) infection, Human immunodeficiency virus (HIV) disease ([address] Infertility, female, Kidney disease, Liver disease, Phlebitis and thrombophlebitis, Pituitary disorder ([address] Polycystic disease, ovaries, Postpartum depression, Rhesus isoimmunization affecting pregnancy, Sickle cell disease ([address] Syphilis, Systemic lupus erythematosus ([address] Thyroid activity decreased, or Trauma. She  has a past surgical history that includes hx other surgical and hx gyn (10/30/2015). She family history includes Cancer in her paternal grandmother; Heart Disease in her maternal grandfather; No Known Problems in her father and mother; Other in her sister. She  reports that she has never smoked. She has never used smokeless tobacco. She reports that she drinks alcohol. She reports that she does not use drugs.      Review of Systems:  Constitutional:  No significant weight loss or weight gain  Eyes:  No blurred vision  CVS:  No significant chest pain  Pulm:  No significant shortness of breath  GI:  No significant nausea or vomiting  :  No significant nocturia  Musculoskeletal:  No significant joint pain at night  Skin:  No significant rashes  Neuro:  No significant dizziness   Psych:  active mood issues - very short, impatient and irritable    Sleep Review of Systems: notable for no difficulty falling asleep; frequent awakenings at night;  regular dreaming noted; nightmares ; no early morning headaches; no memory problems; no concentration issues; no history of any automobile or occupational accidents due to daytime drowsiness. Objective:     Visit Vitals  /70   Pulse 77   Ht 5' 6.5\" (1.689 m)   Wt 195 lb 8 oz (88.7 kg)   SpO2 97%   BMI 31.08 kg/m²         General:   Not in acute distress   Eyes:  Anicteric sclerae, no obvious strabismus   Nose:  No obvious nasal septum deviation    Oropharynx:   Class 4 oropharyngeal outlet, thick tongue base, uvula could not be seen due to low-lying soft palate, narrow tonsilo-pharyngeal pilars   Tonsils:   tonsils are not seen due to low-lying soft palate   Neck:   Neck circ. in \"inches\": 13; midline trachea   Chest/Lungs:  Equal lung expansion, clear on auscultation    CVS:  Normal rate, regular rhythm; no JVD   Skin:  Warm to touch; no obvious rashes   Neuro:  No focal deficits ; no obvious tremor    Psych:  Normal affect,  normal countenance;          Assessment:       ICD-10-CM ICD-9-CM    1. ARACELI (obstructive sleep apnea) G47.33 327.23 POLYSOMNOGRAPHY 1 NIGHT   2. BMI 31.0-31.9,adult Z68.31 V85.31    3. H/O anxiety disorder Z86.59 V11.8          Plan:     * The patient currently has a Minimal risk for having sleep apnea. STOP-BANG score 2.  * Sleep testing was ordered for initial evaluation. Attended testing ordered due to low pre-test probability for ARACELI. * She was provided information on sleep apnea including coresponding risk factors and the importance of proper treatment. * Treatment options if indicated were reviewed today. Patient agrees to a trial of PAP therapy if indicated. * Counseling was provided regarding proper sleep hygiene (including effect of light on sleep), paradoxical intention, stimulus control, sleep environment safety and safe driving. * Effect of sleep disturbance on weight was reviewed. We have recommended a dedicated weight loss through appropriate diet and an exercise regiment as significant weight reduction has been shown to reduce severity of obstructive sleep apnea.      * Patient agrees to telephone [phone]  follow-up by myself or lead sleep technologist shortly after sleep study to review results and plan final management.     (patient has given permission for a message to be left regarding test results and further management if patient cannot be cannot be reached directly). Thank you for allowing us to participate in your patient's medical care. We'll keep you updated on these investigations. Heather Brock MD, FAA  Electronically signed.  04/25/19

## 2019-05-08 ENCOUNTER — HOSPITAL ENCOUNTER (OUTPATIENT)
Dept: SLEEP MEDICINE | Age: 29
Discharge: HOME | End: 2019-05-08
Attending: INTERNAL MEDICINE
Payer: COMMERCIAL

## 2019-05-08 VITALS
DIASTOLIC BLOOD PRESSURE: 73 MMHG | TEMPERATURE: 99 F | BODY MASS INDEX: 30.07 KG/M2 | SYSTOLIC BLOOD PRESSURE: 110 MMHG | HEART RATE: 67 BPM | WEIGHT: 198.4 LBS | HEIGHT: 68 IN | OXYGEN SATURATION: 97 %

## 2019-05-08 DIAGNOSIS — G47.33 OSA (OBSTRUCTIVE SLEEP APNEA): ICD-10-CM

## 2019-05-08 PROCEDURE — 95810 POLYSOM 6/> YRS 4/> PARAM: CPT | Performed by: INTERNAL MEDICINE

## 2019-05-09 ENCOUNTER — TELEPHONE (OUTPATIENT)
Dept: SLEEP MEDICINE | Age: 29
End: 2019-05-09

## 2019-05-15 NOTE — TELEPHONE ENCOUNTER
Chiara Marlee is to be contacted by lead sleep technologist regarding results of Sleep Testing which was indicative of an average AHI of 0 per hour with an SpO2 sorin of 91% and SpO2 of < 88% being 0 minutes. Recommend face to face visit if symptoms persist or worsen over the next 3 to 6 months.

## 2019-05-16 ENCOUNTER — DOCUMENTATION ONLY (OUTPATIENT)
Dept: SLEEP MEDICINE | Age: 29
End: 2019-05-16

## 2019-05-16 NOTE — PROGRESS NOTES
This note is being routed to Dr. Anthony Lacy.   Sleep Medicine consult note and sleep study report in patient's chart for review.     Thank you for the referral.

## 2019-06-03 ENCOUNTER — OFFICE VISIT (OUTPATIENT)
Dept: INTERNAL MEDICINE CLINIC | Age: 29
End: 2019-06-03

## 2019-06-03 VITALS
DIASTOLIC BLOOD PRESSURE: 70 MMHG | BODY MASS INDEX: 30.25 KG/M2 | HEIGHT: 68 IN | OXYGEN SATURATION: 99 % | TEMPERATURE: 98.4 F | WEIGHT: 199.6 LBS | RESPIRATION RATE: 16 BRPM | SYSTOLIC BLOOD PRESSURE: 103 MMHG | HEART RATE: 77 BPM

## 2019-06-03 DIAGNOSIS — R53.82 CHRONIC FATIGUE: ICD-10-CM

## 2019-06-03 DIAGNOSIS — F41.9 ANXIETY: Primary | ICD-10-CM

## 2019-06-03 RX ORDER — BUSPIRONE HYDROCHLORIDE 10 MG/1
10 TABLET ORAL 3 TIMES DAILY
Qty: 90 TAB | Refills: 1 | Status: SHIPPED | OUTPATIENT
Start: 2019-06-03 | End: 2019-07-31 | Stop reason: SDUPTHER

## 2019-06-03 NOTE — PATIENT INSTRUCTIONS
Add buspar 10mg twice a day to three times a day  If after two weeks not working well then increase celexa to 30mg daily   Stop the wellbutrin

## 2019-06-03 NOTE — PROGRESS NOTES
Ms. Lisa Cronin is a new patient who is here to establish care. CC:  Anxiety and New Patient       HPI:      Patient started on Celexa 7 years ago and then during pregnancy changed to Zoloft and then last year changed back to Celexa and adjusted dose and then added Wellbutrin and feels it is not working.     Feels tense and uptight  Feels mind is racing, thinking what to do next which causes fatigue    Patient is not exercising    Plans to start counseling  Does not feel that anxiety is well controlled    Had a normal sleep study, sleeps 8 hours per day    Review of systems:  Constitutional: negative for fever, chills, weight loss, night sweats   Eyes : negative for vision changes, eye pain and discharge  Nose and Throat: negative for tinnitus, sore throat   Cardiovascular: negative for chest pain, palpitations and shortness of breath  Respiratory: negative for shortness of breath, cough and wheezing   Gastroinstestinal: negative for abdominal pain, nausea, vomiting, diarrhea, constipation, and blood in the stool  Musculoskeletal: negative for back ache and joint ache   Genitourinary: negative for dysuria, nocturia, polyuria and hematuria   Neurologic: Negative for focal weakness, numbness or incoordination  Skin: negative for rash, pruritus  Hematologic: negative for easy bruising      Past Medical History:   Diagnosis Date    Anxiety     Contact dermatitis and other eczema, due to unspecified cause     angular chelitis    Psychiatric problem     anxiety - takes Zoloft 50 mg        Past Surgical History:   Procedure Laterality Date    HX GYN  10/30/2015    NVD     HX OTHER SURGICAL      wisdom teeth removed       No Known Allergies    Current Outpatient Medications on File Prior to Visit   Medication Sig Dispense Refill    citalopram (CELEXA) 20 mg tablet TAKE ONE TABLET BY MOUTH DAILY 30 Tab 5    LEVORA-28 0.15-0.03 mg tab TAKE ONE TABLET BY MOUTH EVERY DAY  2    betamethasone dipropionate (DIPROSONE) 0.05 % topical cream Apply  to affected area two (2) times a day. No current facility-administered medications on file prior to visit. family history includes Cancer in her paternal grandmother; Heart Disease in her maternal grandfather; No Known Problems in her father and mother; Other in her sister.     Social History     Socioeconomic History    Marital status:      Spouse name: Not on file    Number of children: Not on file    Years of education: Not on file    Highest education level: Not on file   Occupational History    Not on file   Social Needs    Financial resource strain: Not on file    Food insecurity:     Worry: Not on file     Inability: Not on file    Transportation needs:     Medical: Not on file     Non-medical: Not on file   Tobacco Use    Smoking status: Never Smoker    Smokeless tobacco: Never Used   Substance and Sexual Activity    Alcohol use: Yes     Comment: Socially    Drug use: No    Sexual activity: Yes     Partners: Male     Birth control/protection: None, Pill   Lifestyle    Physical activity:     Days per week: Not on file     Minutes per session: Not on file    Stress: Not on file   Relationships    Social connections:     Talks on phone: Not on file     Gets together: Not on file     Attends Taoist service: Not on file     Active member of club or organization: Not on file     Attends meetings of clubs or organizations: Not on file     Relationship status: Not on file    Intimate partner violence:     Fear of current or ex partner: Not on file     Emotionally abused: Not on file     Physically abused: Not on file     Forced sexual activity: Not on file   Other Topics Concern    Not on file   Social History Narrative    Not on file       Visit Vitals  /70 (BP 1 Location: Right arm, BP Patient Position: Sitting)   Pulse 77   Temp 98.4 °F (36.9 °C) (Oral)   Resp 16   Ht 5' 8\" (1.727 m)   Wt 199 lb 9.6 oz (90.5 kg)   SpO2 99%   BMI 30.35 kg/m²     General:  Well appearing female no acute distress  HEENT:   PERRL,normal conjunctiva. External ear and canals normal, TMs normal.  Hearing normal to voice. Nose without edema or discharge, normal septum. Lips, teeth, gums normal.  Oropharynx: no erythema, no exudates, no lesions, normal tongue. Neck:  Supple. Thyroid normal size, nontender, without nodules. No carotid bruit. No masses or lymphadenopathy  Respiratory: no respiratory distress,  no wheezing, no rhonchi, no rales. No chest wall tenderness. Cardiovascular:  RRR, normal S1S2, no murmur. Gastrointestinal: normal bowel sounds, soft, nontender, without masses. No hepatosplenomegaly. Extremities +2 pulses, no edema, normal sensation   Musculoskeletal:  Normal gait. Normal digits and nails. Normal strength and tone, no atrophy, and no abnormal movement. Skin:  No rash, no lesions, no ulcers. Skin warm, normal turgor, without induration or nodules. Neuro:  A and OX4, fluent speech, cranial nerves normal 2-12. Sensation normal to light touch.   DTR symmetrical  Psych:  Normal affect      Lab Results   Component Value Date/Time    WBC 6.7 12/29/2016 11:46 AM    HGB 14.3 12/29/2016 11:46 AM    HCT 43.6 12/29/2016 11:46 AM    PLATELET 466 14/72/1572 11:46 AM    MCV 89 12/29/2016 11:46 AM     Lab Results   Component Value Date/Time    Sodium 143 12/29/2016 11:46 AM    Potassium 4.3 12/29/2016 11:46 AM    Chloride 105 12/29/2016 11:46 AM    CO2 26 12/29/2016 11:46 AM    Glucose 87 12/29/2016 11:46 AM    BUN 9 12/29/2016 11:46 AM    Creatinine 0.56 (L) 12/29/2016 11:46 AM    BUN/Creatinine ratio 16 12/29/2016 11:46 AM    GFR est  12/29/2016 11:46 AM    GFR est non- 12/29/2016 11:46 AM    Calcium 8.6 (L) 12/29/2016 11:46 AM     Lab Results   Component Value Date/Time    Cholesterol, total 148 01/20/2014 09:10 AM    HDL Cholesterol 55 01/20/2014 09:10 AM    LDL, calculated 82 01/20/2014 09:10 AM    VLDL, calculated 11 01/20/2014 09:10 AM    Triglyceride 55 01/20/2014 09:10 AM     Lab Results   Component Value Date/Time    TSH 0.962 01/20/2014 09:10 AM     No results found for: HBA1C, HGBE8, LYN5AEDW, OQC6SLHN, BAW7ISTZ  Lab Results   Component Value Date/Time    VITAMIN D, 25-HYDROXY 29.9 (L) 01/20/2014 09:10 AM                   Assessment and Plan:     27 yo woman with a hx of anxiety presenting to discuss anxiety    1. Anxiety  - not well controlled, continue celexa. Stop wellbutrin as it can increase anxiety. Add buspar 10mg twice a day to three times a day  If after two weeks not working well then increase celexa to 30mg daily     - VITAMIN D, 25 HYDROXY  - METABOLIC PANEL, COMPREHENSIVE  - CBC WITH AUTOMATED DIFF  - VITAMIN B12  - busPIRone (BUSPAR) 10 mg tablet; Take 1 Tab by mouth three (3) times daily. Dispense: 90 Tab; Refill: 1    2. Chronic fatigue  Suspect due to the anxiety, had normal sleep study. Check reversible causes . Reports recent thyroid was normal  - VITAMIN D, 25 HYDROXY  - METABOLIC PANEL, COMPREHENSIVE  - CBC WITH AUTOMATED DIFF  - VITAMIN B12  - busPIRone (BUSPAR) 10 mg tablet; Take 1 Tab by mouth three (3) times daily. Dispense: 90 Tab; Refill: 1      Follow-up and Dispositions    · Return in about 1 month (around 7/1/2019) for anxiety.           Fernandez Schreiber MD

## 2019-06-04 ENCOUNTER — TELEPHONE (OUTPATIENT)
Dept: INTERNAL MEDICINE CLINIC | Age: 29
End: 2019-06-04

## 2019-06-04 LAB
25(OH)D3+25(OH)D2 SERPL-MCNC: 29.1 NG/ML (ref 30–100)
ALBUMIN SERPL-MCNC: 4.1 G/DL (ref 3.5–5.5)
ALBUMIN/GLOB SERPL: 1.7 {RATIO} (ref 1.2–2.2)
ALP SERPL-CCNC: 50 IU/L (ref 39–117)
ALT SERPL-CCNC: 21 IU/L (ref 0–32)
AST SERPL-CCNC: 22 IU/L (ref 0–40)
BASOPHILS # BLD AUTO: 0 X10E3/UL (ref 0–0.2)
BASOPHILS NFR BLD AUTO: 0 %
BILIRUB SERPL-MCNC: 0.2 MG/DL (ref 0–1.2)
BUN SERPL-MCNC: 8 MG/DL (ref 6–20)
BUN/CREAT SERPL: 11 (ref 9–23)
CALCIUM SERPL-MCNC: 8.8 MG/DL (ref 8.7–10.2)
CHLORIDE SERPL-SCNC: 104 MMOL/L (ref 96–106)
CO2 SERPL-SCNC: 23 MMOL/L (ref 20–29)
CREAT SERPL-MCNC: 0.7 MG/DL (ref 0.57–1)
EOSINOPHIL # BLD AUTO: 0.1 X10E3/UL (ref 0–0.4)
EOSINOPHIL NFR BLD AUTO: 1 %
ERYTHROCYTE [DISTWIDTH] IN BLOOD BY AUTOMATED COUNT: 13 % (ref 12.3–15.4)
GLOBULIN SER CALC-MCNC: 2.4 G/DL (ref 1.5–4.5)
GLUCOSE SERPL-MCNC: 106 MG/DL (ref 65–99)
HCT VFR BLD AUTO: 41.8 % (ref 34–46.6)
HGB BLD-MCNC: 12.9 G/DL (ref 11.1–15.9)
IMM GRANULOCYTES # BLD AUTO: 0 X10E3/UL (ref 0–0.1)
IMM GRANULOCYTES NFR BLD AUTO: 0 %
LYMPHOCYTES # BLD AUTO: 1.9 X10E3/UL (ref 0.7–3.1)
LYMPHOCYTES NFR BLD AUTO: 25 %
MCH RBC QN AUTO: 28 PG (ref 26.6–33)
MCHC RBC AUTO-ENTMCNC: 30.9 G/DL (ref 31.5–35.7)
MCV RBC AUTO: 91 FL (ref 79–97)
MONOCYTES # BLD AUTO: 0.6 X10E3/UL (ref 0.1–0.9)
MONOCYTES NFR BLD AUTO: 7 %
NEUTROPHILS # BLD AUTO: 5.3 X10E3/UL (ref 1.4–7)
NEUTROPHILS NFR BLD AUTO: 67 %
PLATELET # BLD AUTO: 225 X10E3/UL (ref 150–450)
POTASSIUM SERPL-SCNC: 4.3 MMOL/L (ref 3.5–5.2)
PROT SERPL-MCNC: 6.5 G/DL (ref 6–8.5)
RBC # BLD AUTO: 4.61 X10E6/UL (ref 3.77–5.28)
SODIUM SERPL-SCNC: 142 MMOL/L (ref 134–144)
VIT B12 SERPL-MCNC: 518 PG/ML (ref 232–1245)
WBC # BLD AUTO: 7.9 X10E3/UL (ref 3.4–10.8)

## 2019-06-04 NOTE — PROGRESS NOTES
Vitamin D is borderline low recommend that you take 1000 units of vitamin D3 daily  Normal kidney and liver function  Normal blood count  Normal vitamin B12

## 2019-06-04 NOTE — TELEPHONE ENCOUNTER
----- Message from Jocelyn Pickering MD sent at 6/4/2019  1:42 PM EDT -----  Vitamin D is borderline low recommend that you take 1000 units of vitamin D3 daily  Normal kidney and liver function  Normal blood count  Normal vitamin B12

## 2019-07-01 ENCOUNTER — TELEPHONE (OUTPATIENT)
Dept: SLEEP MEDICINE | Age: 29
End: 2019-07-01

## 2019-07-31 DIAGNOSIS — F41.9 ANXIETY: ICD-10-CM

## 2019-07-31 DIAGNOSIS — R53.82 CHRONIC FATIGUE: ICD-10-CM

## 2019-07-31 RX ORDER — BUSPIRONE HYDROCHLORIDE 10 MG/1
10 TABLET ORAL 3 TIMES DAILY
Qty: 90 TAB | Refills: 1 | Status: SHIPPED | OUTPATIENT
Start: 2019-07-31 | End: 2019-08-05 | Stop reason: DRUGHIGH

## 2019-07-31 NOTE — TELEPHONE ENCOUNTER
PCP: Willow Coto MD    Last appt: 6/3/2019  Future Appointments   Date Time Provider Tj Alvarenga   8/5/2019  1:45 PM Appa Lily Blackwell MD [facility] 87       Requested Prescriptions     Pending Prescriptions Disp Refills    busPIRone (BUSPAR) 10 mg tablet 90 Tab 1     Sig: Take 1 Tab by mouth three (3) times daily.

## 2019-08-01 RX ORDER — CITALOPRAM 20 MG/1
TABLET, FILM COATED ORAL
Qty: 30 TAB | Refills: 5 | Status: SHIPPED | OUTPATIENT
Start: 2019-08-01 | End: 2020-01-23 | Stop reason: SDUPTHER

## 2019-08-01 NOTE — TELEPHONE ENCOUNTER
Patient states she needs immediate approval please on refill as patient states she requested refill thru Pharmacy last week & we never received & she is out 3 days now & reports she's feeling symptomatic. Please call if any questions or when approved.  Thank you

## 2019-08-05 ENCOUNTER — OFFICE VISIT (OUTPATIENT)
Dept: INTERNAL MEDICINE CLINIC | Age: 29
End: 2019-08-05

## 2019-08-05 VITALS
RESPIRATION RATE: 18 BRPM | SYSTOLIC BLOOD PRESSURE: 111 MMHG | DIASTOLIC BLOOD PRESSURE: 71 MMHG | TEMPERATURE: 97.8 F | OXYGEN SATURATION: 99 % | BODY MASS INDEX: 30.16 KG/M2 | WEIGHT: 199 LBS | HEART RATE: 56 BPM | HEIGHT: 68 IN

## 2019-08-05 DIAGNOSIS — F41.9 ANXIETY: Primary | ICD-10-CM

## 2019-08-05 RX ORDER — BUSPIRONE HYDROCHLORIDE 15 MG/1
15 TABLET ORAL 3 TIMES DAILY
Qty: 90 TAB | Refills: 1 | Status: SHIPPED | OUTPATIENT
Start: 2019-08-05 | End: 2019-09-19 | Stop reason: SDUPTHER

## 2019-08-05 NOTE — PROGRESS NOTES
Reviewed record in preparation for visit and have obtained necessary documentation. Identified pt with two pt identifiers(name and ). Chief Complaint   Patient presents with   1612 COH Maintenance Due   Topic Date Due    Flu Vaccine  2019       Ms. Hair Shah has a reminder for a \"due or due soon\" health maintenance. I have asked that she discuss this further with her primary care provider for follow-up on this health maintenance. Coordination of Care Questionnaire:  :     1) Have you been to an emergency room, urgent care clinic since your last visit? no   Hospitalized since your last visit? no             2) Have you seen or consulted any other health care providers outside of [address] [place] [address] since your last visit? no  (Include any pap smears or colon screenings in this section.)    3) In the event something were to happen to you and you were unable to speak on your behalf, do you have an Advance Directive/ Living Will in place stating your wishes? NO    Do you have an Advance Directive on file? no    4) Are you interested in receiving information on Advance Directives? NO    Patient is accompanied by self I have received verbal consent from Tatyana Brothers to discuss any/all medical information while they are present in the room.

## 2019-08-05 NOTE — PROGRESS NOTES
CC: Anxiety      HPI:    She is a 26 y.o. female who presents for evaluation of anxiety, takes celexa 20mg. 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 ROS:  Constitutional: negative for fevers, chills, anorexia and weight loss  Eyes:   negative for visual disturbance,  irritation  ENT:   negative for tinnitus,sore throat,nasal congestion,ear pain, sinus pain. Respiratory:  negative for cough, hemoptysis, dyspnea,wheezing  CV:   negative for chest pain, palpitations, lower extremity edema  GI:   negative for nausea, vomiting, diarrhea, abdominal pain,melena  Genitourinary: negative for frequency, dysuria, hematuria  Musculoskel: negative for myalgias, arthralgias, back pain, muscle weakness, joint pain  Neurological:  negative for headaches, dizziness, focal weakness, numbness  Psych:          Has anxiety  Past Medical History:   Diagnosis Date    Anxiety     Contact dermatitis and other eczema, due to unspecified cause     angular chelitis    Psychiatric problem     anxiety - takes Zoloft 50 mg       Current Outpatient Medications on File Prior to Visit   Medication Sig Dispense Refill    citalopram (CELEXA) 20 mg tablet TAKE ONE TABLET BY MOUTH DAILY 30 Tab 5    LEVORA-28 0.15-0.03 mg tab TAKE ONE TABLET BY MOUTH EVERY DAY  2    betamethasone dipropionate (DIPROSONE) 0.05 % topical cream Apply  to affected area two (2) times a day. No current facility-administered medications on file prior to visit.         Past Surgical History:   Procedure Laterality Date    HX GYN  10/30/2015    NVD     HX OTHER SURGICAL      wisdom teeth removed       Family History   Problem Relation Age of Onset    No Known Problems Mother     No Known Problems Father     Other Sister         protein C deficiency    Heart Disease Maternal Grandfather     Cancer Paternal Grandmother         lymphoma     Reviewed and no changes     Social History     Socioeconomic History    Marital status:      Spouse name: Not on file    Number of children: Not on file    Years of education: Not on file    Highest education level: Not on file   Occupational History    Not on file   Social Needs    Financial resource strain: Not on file    Food insecurity:     Worry: Not on file     Inability: Not on file    Transportation needs:     Medical: Not on file     Non-medical: Not on file   Tobacco Use    Smoking status: Never Smoker    Smokeless tobacco: Never Used   Substance and Sexual Activity    Alcohol use: Yes     Comment: Socially    Drug use: No    Sexual activity: Yes     Partners: Male     Birth control/protection: None, Pill   Lifestyle    Physical activity:     Days per week: Not on file     Minutes per session: Not on file    Stress: Not on file   Relationships    Social connections:     Talks on phone: Not on file     Gets together: Not on file     Attends Jain service: Not on file     Active member of club or organization: Not on file     Attends meetings of clubs or organizations: Not on file     Relationship status: Not on file    Intimate partner violence:     Fear of current or ex partner: Not on file     Emotionally abused: Not on file     Physically abused: Not on file     Forced sexual activity: Not on file   Other Topics Concern    Not on file   Social History Narrative    Not on file            Visit Vitals  /71 (BP 1 Location: Right arm, BP Patient Position: Sitting)   Pulse (!) 56   Temp 97.8 °F (36.6 °C) (Oral)   Resp 18   Ht 5' 8\" (1.727 m)   Wt 199 lb (90.3 kg)   SpO2 99%   BMI 30.26 kg/m²       Physical Examination:   General - Well appearing female  HEENT - PERRL, TM no erythema/opacification, normal nasal turbinates, oropharynx no erythema or exudate, MMM  Neck - supple, no bruits, no TMG, no LAD  Pulm - clear to auscultation bilaterally  Cardio - RRR, normal S1 S2, no murmur gallops or rubs  Abd - soft, nontender, no masses, no HSM  Extrem - no edema, +2 distal pulses  Psych - normal affect, appropriate mood    Lab Results   Component Value Date/Time    WBC 7.9 06/03/2019 12:17 PM    HGB 12.9 06/03/2019 12:17 PM    HCT 41.8 06/03/2019 12:17 PM    PLATELET 972 02/47/6659 12:17 PM    MCV 91 06/03/2019 12:17 PM     Lab Results   Component Value Date/Time    Sodium 142 06/03/2019 12:17 PM    Potassium 4.3 06/03/2019 12:17 PM    Chloride 104 06/03/2019 12:17 PM    CO2 23 06/03/2019 12:17 PM    Glucose 106 (H) 06/03/2019 12:17 PM    BUN 8 06/03/2019 12:17 PM    Creatinine 0.70 06/03/2019 12:17 PM    BUN/Creatinine ratio 11 06/03/2019 12:17 PM    GFR est  06/03/2019 12:17 PM    GFR est non- 06/03/2019 12:17 PM    Calcium 8.8 06/03/2019 12:17 PM     Lab Results   Component Value Date/Time    Cholesterol, total 148 01/20/2014 09:10 AM    HDL Cholesterol 55 01/20/2014 09:10 AM    LDL, calculated 82 01/20/2014 09:10 AM    VLDL, calculated 11 01/20/2014 09:10 AM    Triglyceride 55 01/20/2014 09:10 AM     Lab Results   Component Value Date/Time    TSH 0.962 01/20/2014 09:10 AM     No results found for: PSA, PSA2, PSAR1, PSA1, PSAR2, PSA3, PSAR3, [id number], [id number], PSALT  No results found for: HBA1C, HGBE8, YKP6IXDH, UDR8OGOZ, ILF1VBSL  Lab Results   Component Value Date/Time    VITAMIN D, 25-HYDROXY 29.1 (L) 06/03/2019 12:17 PM       Lab Results   Component Value Date/Time    ALT (SGPT) 21 06/03/2019 12:17 PM    AST (SGOT) 22 06/03/2019 12:17 PM    Alk. phosphatase 50 06/03/2019 12:17 PM    Bilirubin, total 0.2 06/03/2019 12:17 PM           Assessment/Plan:    1. Anxiety  - increase stress with going to schools. Continue Celexa 20 mg and increase Buspar to 15mg  - busPIRone (BUSPAR) 15 mg tablet; Take 1 Tab by mouth three (3) times daily. Dispense: 90 Tab;  Refill: 1  -exercises            Karley Wakefield MD

## 2019-08-05 NOTE — PATIENT INSTRUCTIONS
Take 1 pill and a half of buspar 3 times a day until you obtain the new prescription. If not working well we can increase the celexa Office Policies Phone calls/patient messages: Please allow up to 24 hours for someone in the office to contact you about your call or message. Be mindful your provider may be out of the office or your message may require further review. We encourage you to use [employer] for your messages as this is a faster, more efficient way to communicate with our office Medication Refills: 
         
Prescription medications require 48-72 business hours to process. We encourage you to use [employer] for your refills. For controlled medications: Please allow 72 business hours to process. Certain medications may require you to  a written prescription at our office. NO narcotic/controlled medications will be prescribed after 4pm Monday through Friday or on weekends Form/Paperwork Completion: 
         
Please note a $25 fee may incur for all paperwork for completed by our providers. We ask that you allow 7-10 business days. Pre-payment is due prior to picking up/faxing the completed form. You may also download your forms to [employer] to have your doctor print off.

## 2019-09-19 DIAGNOSIS — F41.9 ANXIETY: ICD-10-CM

## 2019-09-19 RX ORDER — BUSPIRONE HYDROCHLORIDE 15 MG/1
15 TABLET ORAL 3 TIMES DAILY
Qty: 90 TAB | Refills: 1 | Status: SHIPPED | OUTPATIENT
Start: 2019-09-19 | End: 2019-11-22 | Stop reason: SDUPTHER

## 2019-09-19 NOTE — TELEPHONE ENCOUNTER
PCP: Yunier Bravo MD    Last appt: 8/5/2019  No future appointments. Requested Prescriptions     Pending Prescriptions Disp Refills    busPIRone (BUSPAR) 15 mg tablet 90 Tab 1     Sig: Take 1 Tab by mouth three (3) times daily.

## 2019-09-25 ENCOUNTER — TELEPHONE (OUTPATIENT)
Dept: INTERNAL MEDICINE CLINIC | Age: 29
End: 2019-09-25

## 2019-09-25 NOTE — TELEPHONE ENCOUNTER
----- Message from Aiea Males sent at 9/24/2019  7:51 PM EDT -----  Regarding: Non-Urgent Medical Question  Contact: [phone]  Uzair Emery! 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 I've been stressed about finding a new job and figuring things out, but I have also been really depressed for about the past month. I kept thinking it would get better with time, but now it's been a month and I'm still really sad, teary, and unmotivated. I've been seeing a counselor and she suggested that I reach out to you and see about adjusting my meds to add or up the dose to help more with the depression piece. In general, anxiety has been the main issue, but I'm just feeling like the depression is creeping in more.

## 2019-09-26 NOTE — TELEPHONE ENCOUNTER
MD Alis Del Valle LPN   Caller: Unspecified Sofie Cui,  3:23 PM)             We can augment the lexapro with adding low dose wellbutrin to improve energy level or we can switch to a new antidepressant called trintellix.    Can you call your insurance and see if trintellix is covered - I feel that would be a better simpler regimen     Responded to [facility] message with the above message from Dr. Courtney Section

## 2019-09-27 ENCOUNTER — TELEPHONE (OUTPATIENT)
Dept: INTERNAL MEDICINE CLINIC | Age: 29
End: 2019-09-27

## 2019-09-27 DIAGNOSIS — F41.8 DEPRESSION WITH ANXIETY: Primary | ICD-10-CM

## 2019-09-27 RX ORDER — BUPROPION HYDROCHLORIDE 100 MG/1
100 TABLET, EXTENDED RELEASE ORAL DAILY
Qty: 30 TAB | Refills: 2 | Status: SHIPPED | OUTPATIENT
Start: 2019-09-27 | End: 2019-12-30 | Stop reason: SDUPTHER

## 2019-09-27 NOTE — TELEPHONE ENCOUNTER
----- Message from Terence Chery sent at 9/26/2019  2:45 PM EDT -----  Regarding: RE: Non-Urgent Medical Question  Contact: [phone]  Because I resigned from my job unexpectedly and am waiting for my new job insurance to kick in, I dont have coverage right now so I will pay out of pocket so whatever you think is best is fine for me  ----- Message -----  From: Hannah Dumont LPN  Sent: 6/18/5344  1:48 PM EDT  To: Terence Chery  Subject: RE: Non-Urgent Medical Question  Good Afternoon,  Per Dr. Tray Florez, We can augment the lexapro with adding low dose wellbutrin to improve energy level or we can switch to a new antidepressant called trintellix. Can you call your insurance and see if trintellix is covered - I feel that would be a better simpler regimen   Thanks,     Leni Quispe, [address] [facility]   Please note- My Chart is for non-urgent health concerns. You can expect a reply from our office within 2 business days. You should not email your provider about emergent health issues. If you have an emergency, please call 911. If you have an urgent concern, please call our office at [phone].       ----- Message -----     From: Terence Chery     Sent: 9/24/2019  7:51 PM EDT       To: Vaishali Hickman MD  Subject: Non-Urgent Medical Question    Hi Dr. Adolph Schaffer! 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 I've been stressed about finding a new job and figuring things out, but I have also been really depressed for about the past month. I kept thinking it would get better with time, but now it's been a month and I'm still really sad, teary, and unmotivated. I've been seeing a counselor and she suggested that I reach out to you and see about adjusting my meds to add or up the dose to help more with the depression piece.  In general, anxiety has been the main issue, but I'm just feeling like the depression is creeping in more.

## 2019-11-22 DIAGNOSIS — F41.9 ANXIETY: ICD-10-CM

## 2019-11-22 RX ORDER — BUSPIRONE HYDROCHLORIDE 15 MG/1
15 TABLET ORAL 3 TIMES DAILY
Qty: 90 TAB | Refills: 1 | Status: SHIPPED | OUTPATIENT
Start: 2019-11-22 | End: 2020-06-12 | Stop reason: ALTCHOICE

## 2019-11-22 NOTE — TELEPHONE ENCOUNTER
PCP: García Carlos MD    Last appt: 8/5/2019  No future appointments. Requested Prescriptions     Pending Prescriptions Disp Refills    busPIRone (BUSPAR) 15 mg tablet 90 Tab 1     Sig: Take 1 Tab by mouth three (3) times daily.

## 2019-11-22 NOTE — TELEPHONE ENCOUNTER
PCP: Deyvi Portillo MD    Last appt: 8/5/2019  No future appointments. Requested Prescriptions     Pending Prescriptions Disp Refills    busPIRone (BUSPAR) 15 mg tablet 90 Tab 1     Sig: Take 1 Tab by mouth three (3) times daily.

## 2019-12-30 DIAGNOSIS — F41.8 DEPRESSION WITH ANXIETY: ICD-10-CM

## 2019-12-30 NOTE — TELEPHONE ENCOUNTER
Patient needs refill done thru [facility] indicated. Patient states requested last week thru Pharmacy & we never received & pharmacy states they have sent 2 times with no response. Please call if any questions.  Thank you

## 2019-12-31 RX ORDER — BUPROPION HYDROCHLORIDE 100 MG/1
100 TABLET, EXTENDED RELEASE ORAL DAILY
Qty: 30 TAB | Refills: 2 | Status: SHIPPED | OUTPATIENT
Start: 2019-12-31 | End: 2020-01-06 | Stop reason: SDUPTHER

## 2019-12-31 NOTE — TELEPHONE ENCOUNTER
PCP: García Carlos MD    Last appt: 8/5/2019  No future appointments. Requested Prescriptions     Pending Prescriptions Disp Refills    buPROPion SR (WELLBUTRIN SR) 100 mg SR tablet 30 Tab 2     Sig: Take 1 Tab by mouth daily.

## 2020-01-06 DIAGNOSIS — F41.8 DEPRESSION WITH ANXIETY: ICD-10-CM

## 2020-01-06 RX ORDER — BUPROPION HYDROCHLORIDE 100 MG/1
100 TABLET, EXTENDED RELEASE ORAL DAILY
Qty: 30 TAB | Refills: 2 | Status: SHIPPED | OUTPATIENT
Start: 2020-01-06 | End: 2020-06-12 | Stop reason: ALTCHOICE

## 2020-01-06 NOTE — TELEPHONE ENCOUNTER
PCP: Terence Nieves MD    Last appt: 8/5/2019  No future appointments. Requested Prescriptions     Pending Prescriptions Disp Refills    buPROPion SR (WELLBUTRIN SR) 100 mg SR tablet 30 Tab 2     Sig: Take 1 Tab by mouth daily.

## 2020-01-13 ENCOUNTER — TELEPHONE (OUTPATIENT)
Dept: INTERNAL MEDICINE CLINIC | Age: 30
End: 2020-01-13

## 2020-01-13 NOTE — TELEPHONE ENCOUNTER
[employer] Office Pool   Phone Number: [phone]             Appointment Request From: Yovana Rasmussen     With Provider: Cori Martinez MD [facility]]     Preferred Date Range: Any     Preferred Times: Friday Afternoon     Reason for visit: Request an Appointment     Comments:   Anxiety/OCD treatment options     Copy/Paste ENVERA

## 2020-01-14 ENCOUNTER — TELEPHONE (OUTPATIENT)
Dept: INTERNAL MEDICINE CLINIC | Age: 30
End: 2020-01-14

## 2020-01-14 NOTE — TELEPHONE ENCOUNTER
----- Message from Abbey Awan sent at 1/13/2020  4:43 PM EST -----  Regarding: Dr. Farnaz Verdin not available    Caller's first and last name and relationship to patient (if not the patient):      Best contact number: [phone]      Preferred date and time: Sometime on a Friday       Scheduled appointment date and time: January 24, 2020 @8:45am      Reason for appointment: Pt needed to change this appointment due to work schedule      Message copied/pasted from Bambi

## 2020-01-14 NOTE — TELEPHONE ENCOUNTER
Amalia Hamlin [employer] Pool   Phone Number: [phone]             Caller's first and last name and relationship (if not the patient): self   Best contact number(s): [phone]   Whose call is being returned: N/A   Details to clarify the request: Pt is returning a call in regards to scheduling.      Copy/Paste  ENVERA

## 2020-01-17 NOTE — TELEPHONE ENCOUNTER
Message was left for patient to contact this office if her appointment on 1/24/20 with Dr. Farshad Woods needs to be changed.

## 2020-01-23 RX ORDER — CITALOPRAM 20 MG/1
TABLET, FILM COATED ORAL
Qty: 30 TAB | Refills: 5 | Status: SHIPPED | OUTPATIENT
Start: 2020-01-23 | End: 2020-06-12 | Stop reason: ALTCHOICE

## 2020-02-20 DIAGNOSIS — F41.9 ANXIETY: ICD-10-CM

## 2020-02-20 RX ORDER — BUSPIRONE HYDROCHLORIDE 15 MG/1
15 TABLET ORAL 3 TIMES DAILY
Qty: 90 TAB | Refills: 1 | OUTPATIENT
Start: 2020-02-20

## 2020-02-20 NOTE — TELEPHONE ENCOUNTER
PCP: Trudi Law MD    Last appt: 8/5/2019  No future appointments. Requested Prescriptions     Pending Prescriptions Disp Refills    busPIRone (BUSPAR) 15 mg tablet 90 Tab 1     Sig: Take 1 Tab by mouth three (3) times daily.

## 2020-06-12 ENCOUNTER — VIRTUAL VISIT (OUTPATIENT)
Dept: INTERNAL MEDICINE CLINIC | Age: 30
End: 2020-06-12

## 2020-06-12 DIAGNOSIS — F41.9 ANXIETY: Primary | ICD-10-CM

## 2020-06-12 RX ORDER — ESCITALOPRAM OXALATE 20 MG/1
20 TABLET ORAL DAILY
Qty: 90 TAB | Refills: 2 | Status: SHIPPED | OUTPATIENT
Start: 2020-06-12 | End: 2020-12-28

## 2020-06-12 NOTE — PROGRESS NOTES
CC: Medication Refill      HPI:    She is a 31 y.o. 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 The patient has been instructed that this meets HIPAA criteria and acknowledges and agrees to this method of visitation. Pursuant to the emergency declaration under the [facility], 1135 waiver authority and the [employer] Act, this Virtual Visit was conducted, with patient's consent, to reduce the patient's risk of exposure to COVID-19 and provide continuity of care for an established patient. Services were provided through a video synchronous discussion virtually to substitute for in-person clinic visit. ROS:  Constitutional: negative for fevers, chills, anorexia and weight loss  10 systems reviewed and negative other than HPI     Past Medical History:   Diagnosis Date    Anxiety     Contact dermatitis and other eczema, due to unspecified cause     angular chelitis    Psychiatric problem     anxiety - takes Zoloft 50 mg       Current Outpatient Medications on File Prior to Visit   Medication Sig Dispense Refill    LEVORA-28 0.15-0.03 mg tab TAKE ONE TABLET BY MOUTH EVERY DAY  2    betamethasone dipropionate (DIPROSONE) 0.05 % topical cream Apply  to affected area two (2) times a day. No current facility-administered medications on file prior to visit.         Past Surgical History:   Procedure Laterality Date    HX GYN  10/30/2015    ELLA     HX OTHER SURGICAL      wisdom teeth removed       Family History   Problem Relation Age of Onset    No Known Problems Mother     No Known Problems Father     Other Sister         protein C deficiency    Heart Disease Maternal Grandfather     Cancer Paternal Grandmother         lymphoma     Reviewed and no changes     Social History     Socioeconomic History    Marital status:      Spouse name: Not on file    Number of children: Not on file    Years of education: Not on file    Highest education level: Not on file   Occupational History    Not on file   Social Needs    Financial resource strain: Not on file    Food insecurity     Worry: Not on file     Inability: Not on file    Transportation needs     Medical: Not on file     Non-medical: Not on file   Tobacco Use    Smoking status: Never Smoker    Smokeless tobacco: Never Used   Substance and Sexual Activity    Alcohol use: Yes     Comment: Socially    Drug use: No    Sexual activity: Yes     Partners: Male     Birth control/protection: None, Pill   Lifestyle    Physical activity     Days per week: Not on file     Minutes per session: Not on file    Stress: Not on file   Relationships    Social connections     Talks on phone: Not on file     Gets together: Not on file     Attends Judaism service: Not on file     Active member of club or organization: Not on file     Attends meetings of clubs or organizations: Not on file     Relationship status: Not on file    Intimate partner violence     Fear of current or ex partner: Not on file     Emotionally abused: Not on file     Physically abused: Not on file     Forced sexual activity: Not on file   Other Topics Concern    Not on file   Social History Narrative    Not on file          There were no vitals taken for this visit.     Physical Examination:   Gen: well appearing female  HEENT: normal conjunctiva, no audible congestion, patient does not see oral erythema, has MMM  Neck: patient does not feel enlarged or tender LAD or masses  Resp: normal respiratory effort, no audible wheezing. CV: patient does not feel palpitations or heart irregularity  Abd: patient does not feel abdominal tenderness or mass, patient does not notice distension  Extrem: patient does not see swelling in ankles or joints. Neuro: Alert and oriented, able to answer questions without difficulty, able to move all extremities and walk normally          Lab Results   Component Value Date/Time    WBC 7.9 06/03/2019 12:17 PM    HGB 12.9 06/03/2019 12:17 PM    HCT 41.8 06/03/2019 12:17 PM    PLATELET 546 67/32/0282 12:17 PM    MCV 91 06/03/2019 12:17 PM     Lab Results   Component Value Date/Time    Sodium 142 06/03/2019 12:17 PM    Potassium 4.3 06/03/2019 12:17 PM    Chloride 104 06/03/2019 12:17 PM    CO2 23 06/03/2019 12:17 PM    Glucose 106 (H) 06/03/2019 12:17 PM    BUN 8 06/03/2019 12:17 PM    Creatinine 0.70 06/03/2019 12:17 PM    BUN/Creatinine ratio 11 06/03/2019 12:17 PM    GFR est  06/03/2019 12:17 PM    GFR est non- 06/03/2019 12:17 PM    Calcium 8.8 06/03/2019 12:17 PM     Lab Results   Component Value Date/Time    Cholesterol, total 148 01/20/2014 09:10 AM    HDL Cholesterol 55 01/20/2014 09:10 AM    LDL, calculated 82 01/20/2014 09:10 AM    VLDL, calculated 11 01/20/2014 09:10 AM    Triglyceride 55 01/20/2014 09:10 AM     Lab Results   Component Value Date/Time    TSH 0.962 01/20/2014 09:10 AM     No results found for: PSA, PSA2, PSAR1, PSA1, PSAR2, PSA3, PSAR3, [id number], [id number], PSALT  No results found for: HBA1C, HGBE8, NWV8YAZU, NIN2PXAH, OHJ3QQBM  Lab Results   Component Value Date/Time    VITAMIN D, 25-HYDROXY 29.1 (L) 06/03/2019 12:17 PM       Lab Results   Component Value Date/Time    ALT (SGPT) 21 06/03/2019 12:17 PM    Alk. phosphatase 50 06/03/2019 12:17 PM    Bilirubin, total 0.2 06/03/2019 12:17 PM           Assessment/Plan:    1.  Anxiety  She has made progress doing better on lexapro instead of celexa and has stopped Wellbutrin. She is taking Buspar once a day and does not note different. She will stop buspar and let me know how she is doing.   - escitalopram oxalate (LEXAPRO) 20 mg tablet; Take 1 Tab by mouth daily. Dispense: 90 Tab; Refill: 2    Follow up in 6-7 months         Tanya Salgado MD    This is an established visit conducted via real time video and audio telemedicine. The patient has been instructed that this meets HIPAA criteria and acknowledges and agrees to this method of visitation.

## 2020-06-12 NOTE — PROGRESS NOTES
Reviewed record in preparation for visit and have obtained necessary documentation. Identified pt with two pt identifiers(name and ). Chief Complaint   Patient presents with    Medication Refill       Health Maintenance Due   Topic Date Due    Pap Test  2019       Ms. Alm Ahumada has a reminder for a \"due or due soon\" health maintenance. I have asked that she discuss this further with her primary care provider for follow-up on this health maintenance. Coordination of Care Questionnaire:  :     1) Have you been to an emergency room, urgent care clinic since your last visit? no   Hospitalized since your last visit? no             2) Have you seen or consulted any other health care providers outside of [address] [place] [address] since your last visit? no  (Include any pap smears or colon screenings in this section.)    3) In the event something were to happen to you and you were unable to speak on your behalf, do you have an Advance Directive/ Living Will in place stating your wishes? NO    Do you have an Advance Directive on file? no    4) Are you interested in receiving information on Advance Directives? NO    Patient is accompanied by self I have received verbal consent from Jeannie Gupta to discuss any/all medical information while they are present in the room.

## 2020-12-28 ENCOUNTER — VIRTUAL VISIT (OUTPATIENT)
Dept: INTERNAL MEDICINE CLINIC | Age: 30
End: 2020-12-28
Payer: COMMERCIAL

## 2020-12-28 DIAGNOSIS — F41.8 DEPRESSION WITH ANXIETY: Primary | ICD-10-CM

## 2020-12-28 PROCEDURE — 99214 OFFICE O/P EST MOD 30 MIN: CPT | Performed by: INTERNAL MEDICINE

## 2020-12-28 NOTE — PROGRESS NOTES
CC: Follow-up (discuss medications (lexapro) not as effective as it was before.)      HPI:    She is a 31 y.o. female who presents for evaluation of anxiety. 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 She previously tried Wellbutrin. She has previously tried sertraline while breast-feeding. She felt that all his medications were initially effective then stopped working. She currently feels on the edge easily irritable and experiencing anxiety. She denies suicidal thoughts. Has difficulty falling asleep and staying asleep. This is an established visit conducted via telemedicine with video. The patient has been instructed that this meets HIPAA criteria and acknowledges and agrees to this method of visitation. Pursuant to the emergency declaration under the [facility], 1135 waiver authority and the [employer] Act, this Virtual Visit was conducted, with patient's consent, to reduce the patient's risk of exposure to COVID-19 and provide continuity of care for an established patient. Services were provided through a video synchronous discussion virtually to substitute for in-person clinic visit.        ROS:  Constitutional: negative for fevers, chills, anorexia and weight loss  10 systems reviewed and negative other than HPI     Past Medical History:   Diagnosis Date    Anxiety     Contact dermatitis and other eczema, due to unspecified cause     angular chelitis    Psychiatric problem     anxiety - takes Zoloft 50 mg       Current Outpatient Medications on File Prior to Visit   Medication Sig Dispense Refill    LEVORA-28 0.15-0.03 mg tab TAKE ONE TABLET BY MOUTH EVERY DAY  2    betamethasone dipropionate (DIPROSONE) 0.05 % topical cream Apply  to affected area two (2) times a day. No current facility-administered medications on file prior to visit.         Past Surgical History:   Procedure Laterality Date    HX GYN  10/30/2015    NVD     HX OTHER SURGICAL      wisdom teeth removed       Family History   Problem Relation Age of Onset    No Known Problems Mother     No Known Problems Father     Other Sister         protein C deficiency    Heart Disease Maternal Grandfather     Cancer Paternal Grandmother         lymphoma     Reviewed and no changes     Social History     Socioeconomic History    Marital status:      Spouse name: Not on file    Number of children: Not on file    Years of education: Not on file    Highest education level: Not on file   Occupational History    Not on file   Social Needs    Financial resource strain: Not on file    Food insecurity     Worry: Not on file     Inability: Not on file    Transportation needs     Medical: Not on file     Non-medical: Not on file   Tobacco Use    Smoking status: Never Smoker    Smokeless tobacco: Never Used   Substance and Sexual Activity    Alcohol use: Yes     Comment: Socially    Drug use: No    Sexual activity: Yes     Partners: Male     Birth control/protection: None, Pill   Lifestyle    Physical activity     Days per week: Not on file     Minutes per session: Not on file    Stress: Not on file   Relationships    Social connections     Talks on phone: Not on file     Gets together: Not on file     Attends Islam service: Not on file     Active member of club or organization: Not on file     Attends meetings of clubs or organizations: Not on file     Relationship status: Not on file    Intimate partner violence     Fear of current or ex partner: Not on file     Emotionally abused: Not on file     Physically abused: Not on file     Forced sexual activity: Not on file   Other Topics Concern    Not on file   Social History Narrative    Not on file          There were no vitals taken for this visit. Physical Examination:   Gen: well appearing female  HEENT: normal conjunctiva, no audible congestion, patient does not see oral erythema, has MMM  Neck: patient does not feel enlarged or tender LAD or masses  Resp: normal respiratory effort, no audible wheezing. CV: patient does not feel palpitations or heart irregularity  Abd: patient does not feel abdominal tenderness or mass, patient does not notice distension  Extrem: patient does not see swelling in ankles or joints.    Neuro: Alert and oriented, able to answer questions without difficulty, able to move all extremities and walk normally   Psych :normal affect appropriate mood       Lab Results   Component Value Date/Time    WBC 7.9 06/03/2019 12:17 PM    HGB 12.9 06/03/2019 12:17 PM    HCT 41.8 06/03/2019 12:17 PM    PLATELET 606 44/16/1381 12:17 PM    MCV 91 06/03/2019 12:17 PM     Lab Results   Component Value Date/Time    Sodium 142 06/03/2019 12:17 PM    Potassium 4.3 06/03/2019 12:17 PM    Chloride 104 06/03/2019 12:17 PM    CO2 23 06/03/2019 12:17 PM    Glucose 106 (H) 06/03/2019 12:17 PM    BUN 8 06/03/2019 12:17 PM    Creatinine 0.70 06/03/2019 12:17 PM    BUN/Creatinine ratio 11 06/03/2019 12:17 PM    GFR est  06/03/2019 12:17 PM    GFR est non- 06/03/2019 12:17 PM    Calcium 8.8 06/03/2019 12:17 PM     Lab Results   Component Value Date/Time    Cholesterol, total 148 01/20/2014 09:10 AM    HDL Cholesterol 55 01/20/2014 09:10 AM    LDL, calculated 82 01/20/2014 09:10 AM    VLDL, calculated 11 01/20/2014 09:10 AM    Triglyceride 55 01/20/2014 09:10 AM     Lab Results   Component Value Date/Time    TSH 0.962 01/20/2014 09:10 AM     No results found for: PSA, Lyndon Canavan, PSAR3, [id number], [id number]  No results found for: HBA1C, HGBE8, KRA2WZAQ, CUV2COYM, VIU9DWIT  Lab Results Component Value Date/Time    VITAMIN D, 25-HYDROXY 29.1 (L) 06/03/2019 12:17 PM       Lab Results   Component Value Date/Time    ALT (SGPT) 21 06/03/2019 12:17 PM    Alk. phosphatase 50 06/03/2019 12:17 PM    Bilirubin, total 0.2 06/03/2019 12:17 PM           Assessment/Plan:    1. Depression with anxiety  -She has noted increase in anxiety and feeling over the edge. She has noticed increased irritation. Initially Celexa worked well then stopped working same for [employer] and Wellbutrin. Trial of Trintellix. She will let me know how she is doing and 2 weeks. Follow-up in 1 month  Patient is not suicidal.  Advised to see a therapist as well. - vortioxetine (Trintellix) 10 mg tablet; Take 1 Tab by mouth daily. Dispense: 30 Tab; Refill: 1    Counseled on possible side effects including GI side effects. Patient is agreeable to the plan all questions answered    Rosemary De Guzman MD    This is an established visit conducted via real time video and audio telemedicine. The patient has been instructed that this meets HIPAA criteria and acknowledges and agrees to this method of visitation.

## 2021-01-05 ENCOUNTER — TELEPHONE (OUTPATIENT)
Dept: INTERNAL MEDICINE CLINIC | Age: 31
End: 2021-01-05

## 2021-01-05 NOTE — TELEPHONE ENCOUNTER
----- Message from Jewels Valdivia sent at 1/5/2021 11:39 AM EST -----  Regarding: Dr. Fran Bustamante / Ximena De Luna first and last name: NA       Reason for call: Prior Authorization for Trintellix       Callback required yes/no and why:Yes       Best contact number(s): [phone]       Details to clarify the [facility]  Is waiting on the prior auth for patient Trintellix Rx , [phone]       Message from [facility]

## 2021-01-08 DIAGNOSIS — F41.8 DEPRESSION WITH ANXIETY: ICD-10-CM

## 2021-01-08 RX ORDER — VORTIOXETINE 10 MG/1
TABLET, FILM COATED ORAL
Qty: 30 TAB | Refills: 0 | Status: SHIPPED | OUTPATIENT
Start: 2021-01-08 | End: 2021-01-25 | Stop reason: DRUGHIGH

## 2021-01-25 ENCOUNTER — PATIENT MESSAGE (OUTPATIENT)
Dept: INTERNAL MEDICINE CLINIC | Age: 31
End: 2021-01-25

## 2021-01-25 NOTE — TELEPHONE ENCOUNTER
Inez Blind 1/25/2021 9:33 AM EST      ----- Message -----  From: Asya Aleman  Sent: 1/25/2021 9:09 AM EST  To: [facility] Nurse Pool  Subject: Visit Follow-Up Question     I have been taking the Tryntellix for about 2 weeks now and I am feeling pretty anxious (agitated, irritated, annoyed, etc.).  I wanted to see if you thought it would be best to increase the dosage from 10 mg to 20 mg?

## 2021-01-28 ENCOUNTER — TELEPHONE (OUTPATIENT)
Dept: INTERNAL MEDICINE CLINIC | Age: 31
End: 2021-01-28

## 2021-01-28 NOTE — TELEPHONE ENCOUNTER
----- Message from Diana Dailey sent at 1/28/2021  9:08 AM EST -----  Regarding: Prescription Question  Contact: [phone]  I have switched to now getting my meds sent through mail order through my insurance ([employer] [facility]. The insurance company sent a request to you for this, but looks like it has not been approved yet. Can you please approve this so I can get the 90 day prescripton sent by mail through my insurance?

## 2021-02-01 NOTE — TELEPHONE ENCOUNTER
----- Message from Nereida Ying sent at 1/28/2021  2:50 PM EST -----  Regarding: RE: Prescription Question  Contact: [phone]    Mailing address  Mailing address for order forms  [facility]  [id number]  [address]  [facility]  [address] [facility] [address]

## 2021-04-05 ENCOUNTER — PATIENT MESSAGE (OUTPATIENT)
Dept: INTERNAL MEDICINE CLINIC | Age: 31
End: 2021-04-05

## 2021-04-05 DIAGNOSIS — F41.8 DEPRESSION WITH ANXIETY: Primary | ICD-10-CM

## 2021-04-05 RX ORDER — PAROXETINE HYDROCHLORIDE 20 MG/1
20 TABLET, FILM COATED ORAL DAILY
Qty: 90 TAB | Refills: 1 | Status: SHIPPED | OUTPATIENT
Start: 2021-04-05 | End: 2021-08-31

## 2021-04-05 NOTE — TELEPHONE ENCOUNTER
Shereen Yi 4/5/2021 11:29 AM EDT      ----- Message -----  From: Clarisse Iqbal  Sent: 4/5/2021 11:18 AM EDT  To: Rodney Tovar  Subject: Prescription Question     Hi Dr. Jaclyn Edwards,     I wanted to reach out to you about my prescription. I have been taking Trintellix for about 3 months now and it really just doesn't seem to be working. It is also much more expensive than the other SSRI's that I have tried in the past.     I know when we met back in January, you mentioned maybe we could try Prozac if the Trintellix didn't work. Wondering if we can switch over to that. It is pretty much the only SSRI I have not tried. Let me know what you think!

## 2021-04-19 NOTE — TELEPHONE ENCOUNTER
Melita Santiago, LPC-IT, SAC-IT       Group Topic:  Coping Skills Education    Date: 4/19/2020  Start Time: 1300  End Time: 1345  Facilitators: CASSANDRA Ashton    Focus: Proper Symptom Management    Number in attendance: 4    Patient participated in a coping skills education and activity group. The objective of the activity is for Patient to learn the four coping skills categories along with example behaviors to apply to specific scenarios related to the PROBLEM. Patient also had opportunity to practice grounding techniques.     Patient was participatory in group and was able to identify current problems that reinforce alcohol consumption. Writer then identified cognitive skills as an essential element in recovery.     Method: Group  Attendance: Present  Participation: Active  Patient Response: Attentive and Interactive  Mood: Bright  Affect: Calm and Positive  Behavior/Socialization: Cooperative and Engaged  Thought Process: Tracking  Task Performance: Follows directions     CASSANDRA Ashton       Group Topic:  Therapeutic Activity    Date: 4/18/2020  Start Time: 1500  End Time: 1555  Facilitators: Melita Santiago    Focus: Debriefing 3D Picture Coloring   Number in attendance: 4    Patient participated in a debriefing session with peers on the day's topics. The Patient was provided an opportunity to discuss how the material applies to their life and recovery. Patient was also encouraged to request clarity on specific themes. The objective of this group is to practice structure socialization that is common within AA, Group therapy and treatment where a Patient is expected to articulate feelings, thoughts, speak on behavior and cope with opposing viewpoints. Patient was then encouraged to debrief about their days and week on their own to weaken dysfunctional thinking.       Method: Group  Attendance: Present  Participation: Active  Patient Response: Appropriate feedback, Attentive, Good eye contact, Interested in topic and Interactive  Mood: Bright  Affect: Calm, Positive and Relaxed  Behavior/Socialization: Appropriate to group, Cooperative and Engaged  Thought Process: Focused  Task Performance: Follows directions     Melita Santiago, LPC-IT, SAC-IT     Group Topic: BH Check-in/Symptom Rating    Date: 4/17/2020  Start Time: 0830  End Time: 0900  Facilitators: Melita Santiago    Focus: Symptoms and Goals  Number in attendance: 3    Morning Check-In Group is an opportunity for Patient's to report out to therapist improved symptoms, symptoms of concerns and therapeutic goals related to recovery.   Patient reports no improved symptoms. Patient reports symptoms of concern are anxiety, depression, and lack of sleep. Patient goals for today is to attend group, talk to doctor, and socialize.   Method: Group  Attendance: Present  Participation: Active  Patient Response: Appropriate feedback, Attentive, Interested in topic and Interactive  Mood: Anxious, Depressed and Irritable  Affect: Positive  Behavior/Socialization: Appropriate to group, Cooperative and Engaged  Thought Process: Racing thoughts and Tracking  Task Performance: Follows directions     Melita Santiago, LPC-IT, SAC-IT     Group Topic: BH Check-in/Symptom Rating    Date: 4/18/2020  Start Time: 0830  End Time: 0915  Facilitators: Melita Santiago    Focus: Symptoms and Goals   Number in attendance: 4    Morning Check-In Group is an opportunity for Patient's to report out to therapist improved symptoms, symptoms of concerns and therapeutic goals related to recovery.      Patient reports improved symptoms are no urges to use, mind is more clear, slept wonderful, decrease in anxiety, restlessness, and worry. Patient reports symptoms of concern are fearful of the future, worry, and hopelessness. Patient goal for today is to attend groups, continue detox, work on 12 step reading, talk about fear and abuse.     Method: Group  Attendance: Present  Participation: Active  Patient Response: Appropriate feedback, Attentive, Good eye contact and Interactive  Mood: Anxious and Depressed  Affect: Positive  Behavior/Socialization: Appropriate to group, Cooperative and Engaged  Thought Process: Racing thoughts and Tracking  Task Performance: Follows directions     Melita Santiago, LPC-IT, SAC-IT     Group Topic: BH Check-in/Symptom Rating    Date: 4/19/2020  Start Time: 0930  End Time: 1015  Facilitators: CASSANDRA Ashton    Focus: Symptoms and Goals   Number in attendance: 3      Check-In group is an opportunity for Patient's to have guided reflection on treatment progression. Patient documents and scores all relative symptoms of concern to indicate improvement and resolution. Patient also is assessed on readiness for discharge and follow-up treatment.     Patient was appropriate during group despite having feelings of anxiety, Patient appeared bright. Patient's anxiety is compounded by \"worry and helplessness.\" Patient also shared new feelings of \"guilt\" after a conversation had the day before. Patient's goals is to work on moving from her fourth step in AA to step 5 and to attend all the groups. Patient's highest symptom rating is 9/10. No urges to drink alcohol or drugs, SI, urge to  Harm self or others.     Symptom 9/10: Worry  Symptom 8/10: Racing thoughts  Symptom 6/10: Hopelessness, Depressed mood  Symptom 5/10; Anxiety   Method: Group  Attendance: Present  Participation: Active  Patient Response: Attentive, Demonstrated insight and Interactive  Mood: Anxious and bright  Affect: Calm and Positive  Behavior/Socialization: Cooperative and Engaged  Thought Process: Tracking  Task Performance: Follows directions     CASSANDRA Ashton     Group Topic: BH Coping Skills Education    Date: 4/17/2020  Start Time: 1045  End Time: 1145  Facilitators: Jesus Patel LPC    Focus: Crisis Survival Skills: Self Soothe  Number in attendance: 2    A presentation was given on how to tolerate a crisis moment and emotional distress by generating soothing experiences that emphasize one or more of the five senses.       Method: Group  Attendance: Did not attend    Jesus Patel LPC, CSAC, CSIT     Group Topic: BH Physical Activity    Date: 4/18/2020  Start Time: 1030  End Time: 1120  Facilitators: Melita Santiago    Focus: Aromatherapy Meditation  Number in attendance: 4    Patient received brief education on meditation styles before being led in an experiential group. The objective of the experiential phase is for Patient to practice guided meditation through aromatherapy and music to decrease symptom intensity.     Patient presented calm and relaxed during education group. Patient shared during group discussion how she never practiced aromatherapy meditation before. Patient expressed after completing the activity she felt rejuvenated, focused, and calm. Patient shared she will continue practicing aromatherapy throughout her sobriety due to all the benefits it offers.   Method: Group  Attendance: Present  Participation: Active  Patient Response: Appropriate feedback, Asked questions, Attentive, Good eye contact, Interested in topic and Interactive  Mood: Bright  Affect: Calm, Positive and Relaxed  Behavior/Socialization: Appropriate to group, Cooperative and Engaged  Thought Process: Focused  Task Performance: Follows directions     Melita Santiago, LPC-IT, SAC-IT     Group Topic: BH Physical Activity    Date: 4/19/2020  Start Time: 1500  End Time: 1545  Facilitators: CASSANDRA Ashton    Focus: Positive/Sober Socialization   Number in attendance: 7    Socialization and Perspective building is a group to reintroduce positive sober socialization into the Patient's realization. While the therapist guides the Patient in a directive activity, meaningful discussion is had on a number of topics related to and not directly to addiction, which demonstrates the reality of not obsessing over craving and the existence of sober fun people.     Method: Group  Attendance: Present  Participation: Active  Patient Response: Attentive and Interactive  Mood: Bright  Affect: Calm and Positive  Behavior/Socialization: Cooperative and Engaged  Thought Process: Tracking  Task Performance: Follows directions     CASSANDRA Ashton     Group Topic: BH Symptom Management    Date: 4/17/2020  Start Time: 1300  End Time: 1345  Facilitators: Jesus Patel LPC    Focus: Self-soothing  Number in attendance: 2    Pt's identified and listed healthy coping skills to build their crisis survive kit using their 5 senses.   Pt identified items that she find pleasant to build her crisis kit/ skills, she shared that she enjoys nature and the ocean, candle aromas.     Jesus Patel LPC, [facility], CSIT      Method: Group  Attendance: Present  Patient Response: Appropriate feedback, Attentive and Quiet  Mood: Anxious and Stressed  Affect: Calm and Tense  Behavior/Socialization: Appropriate to group, Cooperative and Supportive  Thought Process: [place] thinking, Demonstrated insight and Tracking  Task Performance: Follows directions       Group Topic: BH Therapeutic Activity    Date: 4/17/2020  Start Time: 1500  End Time: 1545  Facilitators: Jesus Patel LPC    Focus: DBT BINGO  Number in attendance: 3    Pts participated in a game of DBT Bingo, they identified ways to use the skills on their bingo cards.    Pt identified ways to use the IMPROVE skill and ways to observe her emotions and avoid letting them affect her behaviors.     Jesus Patel LPC, CSAC, CSIT    Method: Group  Attendance: Present  Participation: Active  Patient Response: Appropriate feedback, Asked questions and Attentive  Mood: Anxious  Affect: Calm  Behavior/Socialization: Appropriate to group and Cooperative  Thought Process: [place] thinking and Demonstrated insight  Task Performance: Follows directions   I added the low dose wellbutrin to take in the morning.    Should notice improvement after 3-4 weeks of medications sometimes sooner  Trintellix is too expensive out of pocket Detail Level: Detailed Detail Level: Zone Patient Specific Counseling (Will Not Stick From Patient To Patient): Mupirion ointment to open sores.

## 2021-08-31 DIAGNOSIS — F41.8 DEPRESSION WITH ANXIETY: ICD-10-CM

## 2021-08-31 RX ORDER — PAROXETINE HYDROCHLORIDE 20 MG/1
TABLET, FILM COATED ORAL
Qty: 90 TABLET | Refills: 3 | Status: SHIPPED | OUTPATIENT
Start: 2021-08-31 | End: 2022-05-19 | Stop reason: ALTCHOICE

## 2021-11-23 ENCOUNTER — TELEPHONE (OUTPATIENT)
Dept: INTERNAL MEDICINE CLINIC | Age: 31
End: 2021-11-23

## 2021-11-23 NOTE — TELEPHONE ENCOUNTER
----- Message from Colt Fong sent at 11/23/2021  8:53 AM EST -----  Subject: Message to Provider    QUESTIONS  Information for Provider? pt wants to order a blood panel and dr requested   to see her first before ordering. please call pt to schedule  ---------------------------------------------------------------------------  --------------  CALL BACK INFO  What is the best way for the office to contact you? OK to leave message on   voicemail  Preferred Call Back Phone Number? [phone]  ---------------------------------------------------------------------------  --------------  SCRIPT ANSWERS  Relationship to Patient? Self  (Is the patient requesting to see the provider for a procedure?)?  Yes

## 2021-12-13 ENCOUNTER — VIRTUAL VISIT (OUTPATIENT)
Dept: INTERNAL MEDICINE CLINIC | Age: 31
End: 2021-12-13
Payer: COMMERCIAL

## 2021-12-13 DIAGNOSIS — Z13.1 SCREENING FOR DIABETES MELLITUS: ICD-10-CM

## 2021-12-13 DIAGNOSIS — E55.9 VITAMIN D DEFICIENCY: ICD-10-CM

## 2021-12-13 DIAGNOSIS — F41.9 ANXIETY: Primary | ICD-10-CM

## 2021-12-13 DIAGNOSIS — Z11.59 ENCOUNTER FOR HEPATITIS C SCREENING TEST FOR LOW RISK PATIENT: ICD-10-CM

## 2021-12-13 DIAGNOSIS — R53.82 CHRONIC FATIGUE: ICD-10-CM

## 2021-12-13 DIAGNOSIS — Z13.220 SCREENING, LIPID: ICD-10-CM

## 2021-12-13 PROCEDURE — 99214 OFFICE O/P EST MOD 30 MIN: CPT | Performed by: INTERNAL MEDICINE

## 2021-12-13 NOTE — PROGRESS NOTES
Macy Naqvi (: 1990) is a 27 y.o. female, established patient, here for evaluation of the following chief complaint(s):   Labs and Follow-up   Two pt identifiers confirmed. The patient would like labs drawn, then she would like us to send results to  at [facility]. ASSESSMENT/PLAN:  Below is the assessment and plan developed based on review of pertinent history, labs, studies, and medications. No follow-ups on file. Macy aNqvi, was evaluated through a synchronous (real-time) audio-video encounter. The patient (or guardian if applicable) is aware that this is a billable service. Verbal consent to proceed has been obtained within the past 12 months. The visit was conducted pursuant to the emergency declaration under the [facility], [address] [place] [address] waiver authority and the [employer] and [employer] General Act. Patient identification was verified, and a caregiver was present when appropriate. The patient was located in a state where the provider was credentialed to provide care. An electronic signature was used to authenticate this note.   -- Kim Pereyra LPN

## 2021-12-13 NOTE — PROGRESS NOTES
CC: Labs and Follow-up      HPI:    She is a 27 y.o. female who presents for evaluation of anxiety. 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 She previously tried Wellbutrin. She has previously tried sertraline while breast-feeding. She felt that all his medications were initially effective then stopped working. She saw psychiatrist and taking paxil titrated up and currently at 40mg daily. Walking for one hours at night and has difficulty with falling sleep  Going to sleep at 9 PM   Walking daily   Does not like more high intensity exercise    1 cup of cofee in the AM   Changed paxil to night                This is an established visit conducted via telemedicine with video. The patient has been instructed that this meets HIPAA criteria and acknowledges and agrees to this method of visitation. Pursuant to the emergency declaration under the [facility], [facility] waiver authority and the [employer] and [employer] Act, this Virtual Visit was conducted, with patient's consent, to reduce the patient's risk of exposure to COVID-19 and provide continuity of care for an established patient. Services were provided through a video synchronous discussion virtually to substitute for in-person clinic visit.        ROS:  Constitutional: negative for fevers, chills, anorexia and weight loss  10 systems reviewed and negative other then HPI     Past Medical History:   Diagnosis Date    Anxiety     Contact dermatitis and other eczema, due to unspecified cause     angular chelitis    Psychiatric problem     anxiety - takes Zoloft 50 mg       Current Outpatient Medications on File Prior to Visit   Medication Sig Dispense Refill    PARoxetine (PAXIL) 20 mg tablet TAKE 1 TABLET BY MOUTH  DAILY (Patient taking differently: Take 40 mg by mouth daily. Per pt,she takes 40mg) 90 Tablet 3    LEVORA-28 0.15-0.03 mg tab TAKE ONE TABLET BY MOUTH EVERY DAY  2    betamethasone dipropionate (DIPROSONE) 0.05 % topical cream Apply  to affected area two (2) times a day. No current facility-administered medications on file prior to visit. Past Surgical History:   Procedure Laterality Date    HX GYN  10/30/2015    NVD     HX OTHER SURGICAL      wisdom teeth removed       Family History   Problem Relation Age of Onset    No Known Problems Mother     No Known Problems Father     Other Sister         protein C deficiency    Heart Disease Maternal Grandfather     Cancer Paternal Grandmother         lymphoma     Reviewed and no changes     Social History     Socioeconomic History    Marital status:      Spouse name: Not on file    Number of children: Not on file    Years of education: Not on file    Highest education level: Not on file   Occupational History    Not on file   Tobacco Use    Smoking status: Never Smoker    Smokeless tobacco: Never Used   Substance and Sexual Activity    Alcohol use: Yes     Comment: Socially    Drug use: No    Sexual activity: Yes     Partners: Male     Birth control/protection: None, Pill   Other Topics Concern    Not on file   Social History Narrative    Not on file     Social Determinants of Health     Financial Resource Strain:     Difficulty of Paying Living Expenses: Not on file   Food Insecurity:     Worried About Running Out of Food in the Last Year: Not on file    Annalise of Food in the Last Year: Not on file   Transportation Needs:     Lack of Transportation (Medical): Not on file    Lack of Transportation (Non-Medical):  Not on file   Physical Activity:     Days of Exercise per Week: Not on file    Minutes of Exercise per Session: Not on file   Stress:     Feeling of Stress : Not on file   Social Connections:     Frequency of Communication with Friends and Family: Not on file    Frequency of Social Gatherings with Friends and Family: Not on file    Attends Sikhism Services: Not on file    Active Member of Clubs or Organizations: Not on file    Attends Club or Organization Meetings: Not on file    Marital Status: Not on file   Intimate Partner Violence:     Fear of Current or Ex-Partner: Not on file    Emotionally Abused: Not on file    Physically Abused: Not on file    Sexually Abused: Not on file   Housing Stability:     Unable to Pay for Housing in the Last Year: Not on file    Number of [place] in the Last Year: Not on file    Unstable Housing in the Last Year: Not on file          There were no vitals taken for this visit. Physical Examination:   Gen: well appearing female  HEENT: normal conjunctiva, no audible congestion, patient does not see oral erythema, has MMM  Neck: patient does not feel enlarged or tender LAD or masses  Resp: normal respiratory effort, no audible wheezing. CV: patient does not feel palpitations or heart irregularity  Abd: patient does not feel abdominal tenderness or mass, patient does not notice distension  Extrem: patient does not see swelling in ankles or joints.    Neuro: Alert and oriented, able to answer questions without difficulty, able to move all extremities and walk normally          Lab Results   Component Value Date/Time    WBC 7.9 06/03/2019 12:17 PM    HGB 12.9 06/03/2019 12:17 PM    HCT 41.8 06/03/2019 12:17 PM    PLATELET 148 20/78/5847 12:17 PM    MCV 91 06/03/2019 12:17 PM     Lab Results   Component Value Date/Time    Sodium 142 06/03/2019 12:17 PM    Potassium 4.3 06/03/2019 12:17 PM    Chloride 104 06/03/2019 12:17 PM    CO2 23 06/03/2019 12:17 PM    Glucose 106 (H) 06/03/2019 12:17 PM    BUN 8 06/03/2019 12:17 PM    Creatinine 0.70 06/03/2019 12:17 PM    BUN/Creatinine ratio 11 06/03/2019 12:17 PM    GFR est  06/03/2019 12:17 PM    GFR est non- 06/03/2019 12:17 PM    Calcium 8.8 06/03/2019 12:17 PM     Lab Results   Component Value Date/Time    Cholesterol, total 148 01/20/2014 09:10 AM    HDL Cholesterol 55 01/20/2014 09:10 AM    LDL, calculated 82 01/20/2014 09:10 AM    VLDL, calculated 11 01/20/2014 09:10 AM    Triglyceride 55 01/20/2014 09:10 AM     Lab Results   Component Value Date/Time    TSH 0.962 01/20/2014 09:10 AM     No results found for: PSA, PSA2, PSAR1, Theodore Hoguet, PSAR3, [id number], [id number]  No results found for: HBA1C, PUI5SOML, HNB3GILF, SOV0YQMG  Lab Results   Component Value Date/Time    VITAMIN D, 25-HYDROXY 29.1 (L) 06/03/2019 12:17 PM       Lab Results   Component Value Date/Time    ALT (SGPT) 21 06/03/2019 12:17 PM    Alk. phosphatase 50 06/03/2019 12:17 PM    Bilirubin, total 0.2 06/03/2019 12:17 PM           Assessment/Plan:    1. Anxiety  Doing better on paxil     2. Chronic fatigue  Suspect multifactorial frequent waking at night   Discussed cutting mid day caffeine  Trial of melatonin long acting at night  Has done sleep study and on ARACELI  - TSH 3RD GENERATION; Future  - CBC WITH AUTOMATED DIFF; Future  - METABOLIC PANEL, COMPREHENSIVE; Future    3. Vitamin D deficiency  On vitamin D 1000 units   - VITAMIN D, 25 HYDROXY; Future    4. Encounter for hepatitis C screening test for low risk patient  - HEPATITIS C AB; Future    5. Screening, lipid  - LIPID PANEL; Future    6. Screening for diabetes mellitus  - HEMOGLOBIN A1C WITH EAG; Future            Maddison Squires MD    This is an established visit conducted via real time video and audio telemedicine. The patient has been instructed that this meets HIPAA criteria and acknowledges and agrees to this method of visitation.

## 2021-12-15 ENCOUNTER — APPOINTMENT (OUTPATIENT)
Dept: INTERNAL MEDICINE CLINIC | Age: 31
End: 2021-12-15

## 2021-12-15 LAB
25(OH)D3 SERPL-MCNC: 19.1 NG/ML (ref 30–100)
ALBUMIN SERPL-MCNC: 3.7 G/DL (ref 3.5–5)
ALBUMIN/GLOB SERPL: 1 {RATIO} (ref 1.1–2.2)
ALP SERPL-CCNC: 78 U/L (ref 45–117)
ALT SERPL-CCNC: 37 U/L (ref 12–78)
ANION GAP SERPL CALC-SCNC: 6 MMOL/L (ref 5–15)
AST SERPL-CCNC: 31 U/L (ref 15–37)
BASOPHILS # BLD: 0 K/UL (ref 0–0.1)
BASOPHILS NFR BLD: 1 % (ref 0–1)
BILIRUB SERPL-MCNC: 0.4 MG/DL (ref 0.2–1)
BUN SERPL-MCNC: 11 MG/DL (ref 6–20)
BUN/CREAT SERPL: 16 (ref 12–20)
CALCIUM SERPL-MCNC: 8.9 MG/DL (ref 8.5–10.1)
CHLORIDE SERPL-SCNC: 109 MMOL/L (ref 97–108)
CHOLEST SERPL-MCNC: 222 MG/DL
CO2 SERPL-SCNC: 25 MMOL/L (ref 21–32)
CREAT SERPL-MCNC: 0.69 MG/DL (ref 0.55–1.02)
DIFFERENTIAL METHOD BLD: NORMAL
EOSINOPHIL # BLD: 0 K/UL (ref 0–0.4)
EOSINOPHIL NFR BLD: 1 % (ref 0–7)
ERYTHROCYTE [DISTWIDTH] IN BLOOD BY AUTOMATED COUNT: 12 % (ref 11.5–14.5)
EST. AVERAGE GLUCOSE BLD GHB EST-MCNC: 103 MG/DL
GLOBULIN SER CALC-MCNC: 3.6 G/DL (ref 2–4)
GLUCOSE SERPL-MCNC: 88 MG/DL (ref 65–100)
HBA1C MFR BLD: 5.2 % (ref 4–5.6)
HCT VFR BLD AUTO: 43.5 % (ref 35–47)
HCV AB SERPL QL IA: NONREACTIVE
HDLC SERPL-MCNC: 73 MG/DL
HDLC SERPL: 3 {RATIO} (ref 0–5)
HGB BLD-MCNC: 13.6 G/DL (ref 11.5–16)
IMM GRANULOCYTES # BLD AUTO: 0 K/UL (ref 0–0.04)
IMM GRANULOCYTES NFR BLD AUTO: 0 % (ref 0–0.5)
LDLC SERPL CALC-MCNC: 135.2 MG/DL (ref 0–100)
LYMPHOCYTES # BLD: 1.7 K/UL (ref 0.8–3.5)
LYMPHOCYTES NFR BLD: 33 % (ref 12–49)
MCH RBC QN AUTO: 29.1 PG (ref 26–34)
MCHC RBC AUTO-ENTMCNC: 31.3 G/DL (ref 30–36.5)
MCV RBC AUTO: 92.9 FL (ref 80–99)
MONOCYTES # BLD: 0.4 K/UL (ref 0–1)
MONOCYTES NFR BLD: 7 % (ref 5–13)
NEUTS SEG # BLD: 3.1 K/UL (ref 1.8–8)
NEUTS SEG NFR BLD: 58 % (ref 32–75)
NRBC # BLD: 0 K/UL (ref 0–0.01)
NRBC BLD-RTO: 0 PER 100 WBC
PLATELET # BLD AUTO: 265 K/UL (ref 150–400)
PMV BLD AUTO: 9.5 FL (ref 8.9–12.9)
POTASSIUM SERPL-SCNC: 4.8 MMOL/L (ref 3.5–5.1)
PROT SERPL-MCNC: 7.3 G/DL (ref 6.4–8.2)
RBC # BLD AUTO: 4.68 M/UL (ref 3.8–5.2)
SODIUM SERPL-SCNC: 140 MMOL/L (ref 136–145)
TRIGL SERPL-MCNC: 69 MG/DL (ref ?–150)
TSH SERPL DL<=0.05 MIU/L-ACNC: 1.34 UIU/ML (ref 0.36–3.74)
VLDLC SERPL CALC-MCNC: 13.8 MG/DL
WBC # BLD AUTO: 5.3 K/UL (ref 3.6–11)

## 2021-12-27 RX ORDER — ERGOCALCIFEROL 1.25 MG/1
50000 CAPSULE ORAL
Qty: 12 CAPSULE | Refills: 3 | Status: SHIPPED | OUTPATIENT
Start: 2021-12-27

## 2021-12-28 ENCOUNTER — TELEPHONE (OUTPATIENT)
Dept: INTERNAL MEDICINE CLINIC | Age: 31
End: 2021-12-28

## 2021-12-28 NOTE — TELEPHONE ENCOUNTER
Two pt identifiers confirmed. I spoke to the pt and advised her per , she does not have diabetes. Her  cholesterol: Triglycerides are normal( short term fat storage), HDL good cholesterol is at goal,  LDL which is bad cholesterol is mildly elevated. Dr. Matt Solis recommends   exercising up to 30 minutes daily and increased fiber intake - vegetables, fruits and oats and whole grain.  She needs to decrease fatty food intake.  will repeat cholesterol level in one year.    She has normal kidney, liver function, CBC and a normal thyroid function.     Recommends taking vitamin D 50,000 iu  once a week for 12 weeks. Once she is done with loading dose, she is to take 2,000 iu vitamin D3 once a day over the counter to maintain level. Pt verbalized understanding of information discussed w/ no further questions at this time.   Signed By: Mitzy Cobb LPN     December 28, 2021

## 2021-12-28 NOTE — PROGRESS NOTES
No diabetes  Cholesterol: Triglycerides are normal( short term fat storage), HDL good cholesterol is at goal,  LDL which is bad cholesterol is mildly elevated. Recommend   exercise to 30 minutes daily and increased fiber intake - vegetables, fruits and oats and whole grain. Decreasing fatty food intake. We will repeat cholesterol level in one year. Normal kidney and liver function and normal CBC  Normal thyroid function   Recommend vitamin D loading dose of 50,000 iu  once a week for 12 weeks, once done with loading dose, take 2,000 iu vitamin D3 once a day over the counter to maintain level.

## 2021-12-28 NOTE — PROGRESS NOTES
I left a message for the pt to call me back re: lab results. .   Signed By:Marisabel Viveros LPN    December 28, 2555

## 2022-01-19 ENCOUNTER — OFFICE VISIT (OUTPATIENT)
Dept: INTERNAL MEDICINE CLINIC | Age: 32
End: 2022-01-19
Payer: COMMERCIAL

## 2022-01-19 VITALS
SYSTOLIC BLOOD PRESSURE: 117 MMHG | TEMPERATURE: 97.9 F | RESPIRATION RATE: 16 BRPM | WEIGHT: 207 LBS | HEART RATE: 75 BPM | BODY MASS INDEX: 31.37 KG/M2 | OXYGEN SATURATION: 97 % | DIASTOLIC BLOOD PRESSURE: 75 MMHG | HEIGHT: 68 IN

## 2022-01-19 DIAGNOSIS — L42 PITYRIASIS ROSEA: Primary | ICD-10-CM

## 2022-01-19 PROCEDURE — 99213 OFFICE O/P EST LOW 20 MIN: CPT | Performed by: FAMILY MEDICINE

## 2022-01-19 NOTE — PATIENT INSTRUCTIONS
Zyrtec 10mg daily and pepcid 20mg daily for itching as needed. Use hypoallergenic sensitive skin moisturizer daily. Sun exposure will improve rash. Pityriasis Rosea: Care Instructions  Your Care Instructions     Pityriasis rosea (say \"pit-uh-RY-uh-alicia NATALIE-zee-uh\") is a common skin rash. It usually starts as one scaly, reddish-pink spot on your stomach or back. Days or weeks later, more spots appear. The rash may itch, but it will not spread to other people. No one knows what causes pityriasis rosea. Some doctors believe it is a reaction to a virus. Pityriasis rosea is most common in children and young adults. It lasts 1 to 3 months and then goes away on its own. Medicine can help relieve any itching. Follow-up care is a key part of your treatment and safety. Be sure to make and go to all appointments, and call your doctor if you are having problems. It's also a good idea to know your test results and keep a list of the medicines you take. How can you care for yourself at home? · Use your medicine exactly as prescribed. Call your doctor if you have any problems with your medicine. · Expose your skin to small amounts of sunlight, but avoid sunburn. Sunlight can lessen the rash. · Use a mild soap, such as Dove or Cetaphil, when you wash your skin. · Add a handful of oatmeal (ground to a powder) to your bath. Or you can try an oatmeal bath product, such as Aveeno. Keep the water warm or lukewarm. A hot bath or shower may make the rash more visible and itchy. · Use an over-the-counter 1% hydrocortisone cream for small itchy areas. When should you call for help? Call your doctor now or seek immediate medical care if:    · You have signs of infection such as:  ? Pain, warmth, or swelling near the rash. ? Red streaks near the rash. ? Pus coming from the rash. ? A fever.    Watch closely for changes in your health, and be sure to contact your doctor if:    · You see the rash on the palms of your hands or the soles of your feet.     · You do not get better as expected. Where can you learn more? Go to http://www.gray.com/  Enter S327 in the search box to learn more about \"Pityriasis Rosea: Care Instructions. \"  Current as of: March 3, 2021               Content Version: 13.0  © 5657-9228 [employer]. Care instructions adapted under license by [employer] (which disclaims liability or warranty for this information). If you have questions about a medical condition or this instruction, always ask your healthcare professional. [place] [address] any warranty or liability for your use of this information.

## 2022-01-19 NOTE — PROGRESS NOTES
Rene Esparza is a 27 y.o. female patient of Dr Mamadou Claros who presents with concern of rash. Onset with one spot on right shoulder 2 weeks ago, then one week ago spread to right neck, chest, abdomen. Not on lower arms or legs. Itchy on neck only. Comes and goes. No fever. No new medications or supplements. No new foods. No recent NSAIDS/aspirin. No recent alcohol use. Took covid test, negative. Took benadryl, too groggy. Past Medical History:   Diagnosis Date    Anxiety     Contact dermatitis and other eczema, due to unspecified cause     angular chelitis    Psychiatric problem     anxiety - takes Zoloft 50 mg       Family History   Problem Relation Age of Onset    No Known Problems Mother     No Known Problems Father     Other Sister         protein C deficiency    Heart Disease Maternal Grandfather     Cancer Paternal Grandmother         lymphoma       Social History     Socioeconomic History    Marital status:      Spouse name: Not on file    Number of children: Not on file    Years of education: Not on file    Highest education level: Not on file   Occupational History    Not on file   Tobacco Use    Smoking status: Never Smoker    Smokeless tobacco: Never Used   Substance and Sexual Activity    Alcohol use: Yes     Comment: Socially    Drug use: No    Sexual activity: Yes     Partners: Male     Birth control/protection: None, Pill   Other Topics Concern    Not on file   Social History Narrative    Not on file     Social Determinants of Health     Financial Resource Strain:     Difficulty of Paying Living Expenses: Not on file   Food Insecurity:     Worried About Running Out of Food in the Last Year: Not on file    Annalise of Food in the Last Year: Not on file   Transportation Needs:     Lack of Transportation (Medical): Not on file    Lack of Transportation (Non-Medical):  Not on file   Physical Activity:     Days of Exercise per Week: Not on file    Minutes of Exercise per Session: Not on file   Stress:     Feeling of Stress : Not on file   Social Connections:     Frequency of Communication with Friends and Family: Not on file    Frequency of Social Gatherings with Friends and Family: Not on file    Attends Jain Services: Not on file    Active Member of [address] [place] [address] or Organizations: Not on file    Attends Club or Organization Meetings: Not on file    Marital Status: Not on file   Intimate Partner Violence:     Fear of Current or Ex-Partner: Not on file    Emotionally Abused: Not on file    Physically Abused: Not on file    Sexually Abused: Not on file   Housing Stability:     Unable to Pay for Housing in the Last Year: Not on file    Number of [place] in the Last Year: Not on file    Unstable Housing in the Last Year: Not on file       Current Outpatient Medications on File Prior to Visit   Medication Sig Dispense Refill    ergocalciferol (ERGOCALCIFEROL) 1,250 mcg (50,000 unit) capsule Take 1 Capsule by mouth every seven (7) days. 12 Capsule 3    PARoxetine (PAXIL) 20 mg tablet TAKE 1 TABLET BY MOUTH  DAILY (Patient taking differently: Take 40 mg by mouth daily. Per pt,she takes 40mg) 90 Tablet 3    LEVORA-28 0.15-0.03 mg tab TAKE ONE TABLET BY MOUTH EVERY DAY  2    betamethasone dipropionate (DIPROSONE) 0.05 % topical cream Apply  to affected area two (2) times a day. No current facility-administered medications on file prior to visit. Review of Systems  Pertinent items are noted in HPI. Objective:     Visit Vitals  /75 (BP 1 Location: Left arm, BP Patient Position: Sitting, BP Cuff Size: Adult)   Pulse 75   Temp 97.9 °F (36.6 °C) (Temporal)   Resp 16   Ht 5' 8\" (1.727 m)   Wt 207 lb (93.9 kg)   SpO2 97%   BMI 31.47 kg/m²     Gen: well appearing female  Resp:  No wheezing, no rhonchi, no rales. CV:  RRR, normal S1S2, no murmur.     Skin: Pale pink patches on right neck, chest, with fine scale, no superinfection    Assessment/Plan: ICD-10-CM ICD-9-CM    1. Pityriasis rosea  L42 696. 3    Zyrtec 10mg daily and pepcid 20mg daily for itching as needed. Use hypoallergenic sensitive skin moisturizer daily. Sun exposure will improve rash. Call if persist beyond 6 weeks        Follow-up and Dispositions    · Return if symptoms worsen or fail to improve.          Patricia Youngblood MD

## 2022-03-19 PROBLEM — Z71.89 ADVANCE DIRECTIVE DISCUSSED WITH PATIENT: Status: ACTIVE | Noted: 2017-08-03

## 2022-05-19 ENCOUNTER — VIRTUAL VISIT (OUTPATIENT)
Dept: INTERNAL MEDICINE CLINIC | Age: 32
End: 2022-05-19
Payer: COMMERCIAL

## 2022-05-19 DIAGNOSIS — F41.9 ANXIETY: Primary | ICD-10-CM

## 2022-05-19 DIAGNOSIS — F51.01 PRIMARY INSOMNIA: ICD-10-CM

## 2022-05-19 PROCEDURE — 99214 OFFICE O/P EST MOD 30 MIN: CPT | Performed by: INTERNAL MEDICINE

## 2022-05-19 RX ORDER — CHOLECALCIFEROL (VITAMIN D3) 125 MCG
CAPSULE ORAL
COMMUNITY

## 2022-05-19 RX ORDER — VENLAFAXINE HYDROCHLORIDE 75 MG/1
75 CAPSULE, EXTENDED RELEASE ORAL DAILY
Qty: 90 CAPSULE | Refills: 0 | Status: SHIPPED | OUTPATIENT
Start: 2022-05-19 | End: 2022-08-29

## 2022-05-19 RX ORDER — VENLAFAXINE HYDROCHLORIDE 75 MG/1
1 CAPSULE, EXTENDED RELEASE ORAL DAILY
COMMUNITY
Start: 2022-04-19 | End: 2022-05-19 | Stop reason: SDUPTHER

## 2022-05-19 RX ORDER — VENLAFAXINE HYDROCHLORIDE 37.5 MG/1
37.5 CAPSULE, EXTENDED RELEASE ORAL DAILY
Qty: 90 CAPSULE | Refills: 0 | Status: SHIPPED | OUTPATIENT
Start: 2022-05-19

## 2022-05-19 RX ORDER — TRAZODONE HYDROCHLORIDE 50 MG/1
25 TABLET ORAL
Qty: 30 TABLET | Refills: 1
Start: 2022-05-19 | End: 2022-05-19 | Stop reason: SDUPTHER

## 2022-05-19 RX ORDER — TRAZODONE HYDROCHLORIDE 50 MG/1
25 TABLET ORAL
Qty: 90 TABLET | Refills: 1 | Status: SHIPPED | OUTPATIENT
Start: 2022-05-19 | End: 2022-10-28 | Stop reason: SDUPTHER

## 2022-05-19 NOTE — PROGRESS NOTES
1. \"Have you been to the ER, urgent care clinic since your last visit? Hospitalized since your last visit? \" No    2. \"Have you seen or consulted any other health care providers outside of the [address] [place] [address] since your last visit? \" No     3. For patients aged 39-70: Has the patient had a colonoscopy / FIT/ Cologuard? NA - based on age      If the patient is female:    4. For patients aged 41-77: Has the patient had a mammogram within the past 2 years? NA - based on age or sex      11. For patients aged 21-65: Has the patient had a pap smear? Yes - Care Gap present.  Rooming MA/LPN to request most recent results  [address]

## 2022-05-19 NOTE — PROGRESS NOTES
CC: Anxiety      HPI:    She is a 34 y.o. female who presents for evaluation of anxiety. 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 She previously tried Wellbutrin. She has previously tried sertraline while breast-feeding. She felt that all his medications were initially effective then stopped working. She had Wellbutrin added to SSRI and and worked well for 6 months  Saw psychiatrist and changed to effexor 75 mg daily   Currently feels on edge as house being remodeled  She wants to increase effexor dose  However she is concerned that the withdrawal side effects if needs to come off is going to be too much    She was also started on trazodone to have more restful sleep                  This is an established visit conducted via telemedicine with video. The patient has been instructed that this meets HIPAA criteria and acknowledges and agrees to this method of visitation. Pursuant to the emergency declaration under the [facility], 1135 waiver authority and the [employer] Act, this Virtual Visit was conducted, with patient's consent, to reduce the patient's risk of exposure to COVID-19 and provide continuity of care for an established patient. Services were provided through a video synchronous discussion virtually to substitute for in-person clinic visit.        ROS:  Constitutional: negative for fevers, chills, anorexia and weight loss  10 systems reviewed and negative other then HPI     Past Medical History:   Diagnosis Date    Anxiety     Contact dermatitis and other eczema, due to unspecified cause     angular chelitis    Psychiatric problem     anxiety - takes Zoloft 50 mg Current Outpatient Medications on File Prior to Visit   Medication Sig Dispense Refill    cholecalciferol, vitamin D3, (Vitamin D3) 50 mcg (2,000 unit) tab Take  by mouth.  ergocalciferol (ERGOCALCIFEROL) 1,250 mcg (50,000 unit) capsule Take 1 Capsule by mouth every seven (7) days. 12 Capsule 3    LEVORA-28 0.15-0.03 mg tab TAKE ONE TABLET BY MOUTH EVERY DAY  2    betamethasone dipropionate (DIPROSONE) 0.05 % topical cream Apply  to affected area two (2) times a day. No current facility-administered medications on file prior to visit. Past Surgical History:   Procedure Laterality Date    HX GYN  10/30/2015    NVD     HX OTHER SURGICAL      wisdom teeth removed       Family History   Problem Relation Age of Onset    No Known Problems Mother     No Known Problems Father     Other Sister         protein C deficiency    Heart Disease Maternal Grandfather     Cancer Paternal Grandmother         lymphoma     Reviewed and no changes     Social History     Socioeconomic History    Marital status:      Spouse name: Not on file    Number of children: Not on file    Years of education: Not on file    Highest education level: Not on file   Occupational History    Not on file   Tobacco Use    Smoking status: Never Smoker    Smokeless tobacco: Never Used   Substance and Sexual Activity    Alcohol use: Yes     Comment: Socially    Drug use: No    Sexual activity: Yes     Partners: Male     Birth control/protection: None, Pill   Other Topics Concern    Not on file   Social History Narrative    Not on file     Social Determinants of Health     Financial Resource Strain:     Difficulty of Paying Living Expenses: Not on file   Food Insecurity:     Worried About Running Out of Food in the Last Year: Not on file    Annalise of Food in the Last Year: Not on file   Transportation Needs:     Lack of Transportation (Medical): Not on file    Lack of Transportation (Non-Medical):  Not on file   Physical Activity:     Days of Exercise per Week: Not on file    Minutes of Exercise per Session: Not on file   Stress:     Feeling of Stress : Not on file   Social Connections:     Frequency of Communication with Friends and Family: Not on file    Frequency of Social Gatherings with Friends and Family: Not on file    Attends Islam Services: Not on file    Active Member of [address] [place] [address] or Organizations: Not on file    Attends Club or Organization Meetings: Not on file    Marital Status: Not on file   Intimate Partner Violence:     Fear of Current or Ex-Partner: Not on file    Emotionally Abused: Not on file    Physically Abused: Not on file    Sexually Abused: Not on file   Housing Stability:     Unable to Pay for Housing in the Last Year: Not on file    Number of [place] in the Last Year: Not on file    Unstable Housing in the Last Year: Not on file          There were no vitals taken for this visit. Physical Examination:   Gen: well appearing female  HEENT: normal conjunctiva, no audible congestion, patient does not see oral erythema, has MMM  Neck: patient does not feel enlarged or tender LAD or masses  Resp: normal respiratory effort, no audible wheezing. CV: patient does not feel palpitations or heart irregularity  Abd: patient does not feel abdominal tenderness or mass, patient does not notice distension  Extrem: patient does not see swelling in ankles or joints.    Neuro: Alert and oriented, able to answer questions without difficulty, able to move all extremities and walk normally          Lab Results   Component Value Date/Time    WBC 5.3 12/15/2021 08:04 AM    HGB 13.6 12/15/2021 08:04 AM    HCT 43.5 12/15/2021 08:04 AM    PLATELET 447 76/38/1083 08:04 AM    MCV 92.9 12/15/2021 08:04 AM     Lab Results   Component Value Date/Time    Sodium 140 12/15/2021 08:04 AM    Potassium 4.8 12/15/2021 08:04 AM    Chloride 109 (H) 12/15/2021 08:04 AM    CO2 25 12/15/2021 08:04 AM    Anion gap 6 12/15/2021 08:04 AM    Glucose 88 12/15/2021 08:04 AM    BUN 11 12/15/2021 08:04 AM    Creatinine 0.69 12/15/2021 08:04 AM    BUN/Creatinine ratio 16 12/15/2021 08:04 AM    GFR est AA >60 12/15/2021 08:04 AM    GFR est non-AA >60 12/15/2021 08:04 AM    Calcium 8.9 12/15/2021 08:04 AM     Lab Results   Component Value Date/Time    Cholesterol, total 222 (H) 12/15/2021 08:04 AM    HDL Cholesterol 73 12/15/2021 08:04 AM    LDL, calculated 135.2 (H) 12/15/2021 08:04 AM    VLDL, calculated 13.8 12/15/2021 08:04 AM    Triglyceride 69 12/15/2021 08:04 AM    CHOL/HDL Ratio 3.0 12/15/2021 08:04 AM     Lab Results   Component Value Date/Time    TSH 1.34 12/15/2021 08:04 AM     No results found for: PSA, Leanor Jo, [id number], [id number]  Lab Results   Component Value Date/Time    Hemoglobin A1c 5.2 12/15/2021 08:04 AM     Lab Results   Component Value Date/Time    Vitamin D 25-Hydroxy 19.1 (L) 12/15/2021 08:04 AM       Lab Results   Component Value Date/Time    ALT (SGPT) 37 12/15/2021 08:04 AM    Alk. phosphatase 78 12/15/2021 08:04 AM    Bilirubin, total 0.4 12/15/2021 08:04 AM           Assessment/Plan:    1. Anxiety  Has been on multiple SSRIs which work well for a few months then stop working and currently on effexor 75 mg and not very well controlled, also has construction going on in the home  Will increase dose by 37.5 mg   Counseled on side effects and withdrawal if misses dose  2. Chronic fatigue  Suspect multifactorial frequent waking at night   Sleeping better with trazodone  Orders Placed This Encounter    DISCONTD: venlafaxine-SR (EFFEXOR-XR) 75 mg capsule     Sig: Take 1 Capsule by mouth daily.  cholecalciferol, vitamin D3, (Vitamin D3) 50 mcg (2,000 unit) tab     Sig: Take  by mouth.  DISCONTD: traZODone (DESYREL) 50 mg tablet     Sig: Take 0.5 Tablets by mouth nightly.      Dispense:  30 Tablet     Refill:  1    venlafaxine-SR (EFFEXOR-XR) 37.5 mg capsule     Sig: Take 1 Capsule by mouth daily. Dispense:  90 Capsule     Refill:  0    venlafaxine-SR (EFFEXOR-XR) 75 mg capsule     Sig: Take 1 Capsule by mouth daily. Dispense:  90 Capsule     Refill:  0    traZODone (DESYREL) 50 mg tablet     Sig: Take 0.5 Tablets by mouth nightly. Dispense:  90 Tablet     Refill:  1         Follow-up and Dispositions    · Return in about 3 months (around 8/19/2022) for anxiety. Juliette Mckee MD    This is an established visit conducted via real time video and audio telemedicine. The patient has been instructed that this meets HIPAA criteria and acknowledges and agrees to this method of visitation.

## 2022-05-20 ENCOUNTER — TELEPHONE (OUTPATIENT)
Dept: INTERNAL MEDICINE CLINIC | Age: 32
End: 2022-05-20

## 2022-07-05 ENCOUNTER — TELEPHONE (OUTPATIENT)
Dept: INTERNAL MEDICINE CLINIC | Age: 32
End: 2022-07-05

## 2022-07-05 NOTE — TELEPHONE ENCOUNTER
Called, spoke to pt  Received two pt identifiers  Pt offered and accepted appt for 07/11/22 @ 1120  Pt verbalizes understanding of the instructions and has no further questions at this time.

## 2022-07-05 NOTE — TELEPHONE ENCOUNTER
At [address] [place] slipped on a rock and hit head on a rock on Fathers day. Pt states saw stars but no LOC  Has been getting headaches mostly in the evening and stops when she wakes up in the morning  Pt states has been getting 5 or 6. Pt states has had some somewhat during the day. Pt states no lumps  Pt states no h/o of concussions  No blurry vision or decrease in vision  Pt states has been taking tylenol an Ibuprofen with no relief  Informed pt if headaches become more frequent and worsen, then to head to the ER. Advised pt will send to Dr. Lagos Nobles and callback later  Pt verbalizes understanding of the instructions and has no further questions at this time.

## 2022-07-11 ENCOUNTER — HOSPITAL ENCOUNTER (OUTPATIENT)
Dept: CT IMAGING | Age: 32
Discharge: HOME | End: 2022-07-11
Attending: FAMILY MEDICINE
Payer: COMMERCIAL

## 2022-07-11 ENCOUNTER — OFFICE VISIT (OUTPATIENT)
Dept: INTERNAL MEDICINE CLINIC | Age: 32
End: 2022-07-11
Payer: COMMERCIAL

## 2022-07-11 VITALS
SYSTOLIC BLOOD PRESSURE: 109 MMHG | BODY MASS INDEX: 32.1 KG/M2 | HEIGHT: 68 IN | RESPIRATION RATE: 16 BRPM | TEMPERATURE: 98.3 F | WEIGHT: 211.8 LBS | HEART RATE: 66 BPM | OXYGEN SATURATION: 99 % | DIASTOLIC BLOOD PRESSURE: 69 MMHG

## 2022-07-11 DIAGNOSIS — G44.319 ACUTE POST-TRAUMATIC HEADACHE, NOT INTRACTABLE: ICD-10-CM

## 2022-07-11 DIAGNOSIS — S10.93XA CONTUSION OF HEAD AND NECK REGION: ICD-10-CM

## 2022-07-11 DIAGNOSIS — S00.93XA CONTUSION OF HEAD AND NECK REGION: ICD-10-CM

## 2022-07-11 DIAGNOSIS — S00.93XA CONTUSION OF HEAD AND NECK REGION: Primary | ICD-10-CM

## 2022-07-11 DIAGNOSIS — S10.93XA CONTUSION OF HEAD AND NECK REGION: Primary | ICD-10-CM

## 2022-07-11 PROCEDURE — 70450 CT HEAD/BRAIN W/O DYE: CPT

## 2022-07-11 PROCEDURE — 99214 OFFICE O/P EST MOD 30 MIN: CPT | Performed by: FAMILY MEDICINE

## 2022-07-11 NOTE — PROGRESS NOTES
SUBJECTIVE:   Ms. Roseanne Alvarez, a pt of Dr. Kota Gonsalez, is a 34 y.o. female who is here for follow up of routine medical issues. Fall: She fell and hit her head on a river rock on June 19th. She experienced achiness of the left side of her body for a week. She denies losing consciousness, dizziness, changes in vision, hematoma of her head, and nausea. She experiences worsening headaches, which occurs 3 days a week. The headaches have been persistent, but it is not debilitating. She notes that the headache is dull and persistent, which will lasts 1.5 days for each flare up. At this time, she is otherwise doing well and has brought no other complaints to my attention today. For a list of the medical issues addressed today, see the assessment and plan below. PMH:   Past Medical History:   Diagnosis Date    Anxiety     Contact dermatitis and other eczema, due to unspecified cause     angular chelitis    Psychiatric problem     anxiety - takes Zoloft 50 mg     PSH:  has a past surgical history that includes hx other surgical and hx gyn (10/30/2015). All: has No Known Allergies. MEDS:   Current Outpatient Medications   Medication Sig    cholecalciferol, vitamin D3, (Vitamin D3) 50 mcg (2,000 unit) tab Take  by mouth.  venlafaxine-SR (EFFEXOR-XR) 37.5 mg capsule Take 1 Capsule by mouth daily.  venlafaxine-SR (EFFEXOR-XR) 75 mg capsule Take 1 Capsule by mouth daily.  traZODone (DESYREL) 50 mg tablet Take 0.5 Tablets by mouth nightly.  ergocalciferol (ERGOCALCIFEROL) 1,250 mcg (50,000 unit) capsule Take 1 Capsule by mouth every seven (7) days.  LEVORA-28 0.15-0.03 mg tab TAKE ONE TABLET BY MOUTH EVERY DAY     No current facility-administered medications for this visit. FH: family history includes Cancer in her paternal grandmother; Heart Disease in her maternal grandfather; No Known Problems in her father and mother; Other in her sister. SH:  reports that she has never smoked.  She has never used smokeless tobacco. She reports current alcohol use. She reports that she does not use drugs. Review of Systems - History obtained from the patient  General ROS: no fever, chills, fatigue, body aches  Psychological ROS: no change in anxiety, depression, SI/HI  Ophthalmic ROS: no blurred vision, myopia, double vision  ENT ROS: no dysphagia, otalgia, otorrhea, rhinorrhea, post nasal drip  Respiratory ROS: no cough, shortness of breath, or wheezing  Cardiovascular ROS: no chest pain or dyspnea on exertion  Gastrointestinal ROS: no abdominal pain, change in bowel habits, or black or bloody stools  Genito-Urinary ROS: no frequency, urgency, incontinence, dysuria, hematouria  Musculoskeletal ROS: no arthralagia, myalgia  Neurological ROS: no headaches, dizziness, lightheadedness, tremors, seizures  Dermatological ROS: no rash or lesions    OBJECTIVE:   Vitals:   Visit Vitals  /69 (BP 1 Location: Right arm, BP Patient Position: Sitting, BP Cuff Size: Large adult)   Pulse 66   Temp 98.3 °F (36.8 °C) (Temporal)   Resp 16   Ht 5' 8\" (1.727 m)   Wt 211 lb 12.8 oz (96.1 kg)   SpO2 99%   BMI 32.20 kg/m²      Gen: Pleasant 34 y.o.  female in NAD. HEENT: PERRLA. EOMI. OP moist and pink. Neck: Supple. No LAD. HEART: RRR, No M/G/R.      LUNGS: CTAB No W/R. ABDOMEN: S, NT, ND, BS+. EXTREMITIES: Warm. No C/C/E.    MUSCULOSKELETAL: Normal ROM, muscle strength 5/5 all groups. NEURO: Alert and oriented x 3. Cranial nerves grossly intact. No focal sensory or motor deficits noted. SKIN: Warm. Dry. No rashes or other lesions noted. ASSESSMENT/ PLAN: Diagnoses and all orders for this visit:    1. Contusion of head and neck region  -     CT HEAD WO CONT; Future  -     CTA HEAD NECK W CONT; Future    2.  Acute post-traumatic headache, not intractable  -     CT HEAD WO CONT; Future  -     CTA HEAD NECK W CONT; Future    Acute Post-Traumatic Headache/ Contusion of head and neck: I ordered a CT of the head and CTA of head neck. Depending on the results of the CT scan, I may prescribed Maxalt. Addendum: CT results have been reviewed and do not show any acute abnormalities. The results were reviewed with Mrs. Alexandria Darby. A prescription for Maxalt 5 mg will be sent to her pharmacy. She was advised that if the headache persisted she could repeat 1 dose in 2 hours one time. ICD-10-CM ICD-9-CM    1. Contusion of head and neck region  S00.93XA 920 CT HEAD WO CONT    S10.93XA  CTA HEAD NECK W CONT   2. Acute post-traumatic headache, not intractable  G44.319 339.21 CT HEAD WO CONT      CTA HEAD NECK W CONT        Follow-up and Dispositions    · Return if symptoms worsen or fail to improve. Routing History         I have reviewed the patient's medications and risks/side effects/benefits were discussed. Diagnosis(-es) explained to patient and questions answered. Literature provided where appropriate.      Written by Yana Posadas, as dictated by Álvaro Gardner MD.

## 2022-07-11 NOTE — PROGRESS NOTES
1. \"Have you been to the ER, urgent care clinic since your last visit? Hospitalized since your last visit? \" No    2. \"Have you seen or consulted any other health care providers outside of the [address] [place] [address] since your last visit? \" No     3. For patients aged 39-70: Has the patient had a colonoscopy / FIT/ Cologuard? NA - based on age      If the patient is female:    4. For patients aged 41-77: Has the patient had a mammogram within the past 2 years? NA - based on age or sex      11. For patients aged 21-65: Has the patient had a pap smear? Yes - Care Gap present.  Most recent result on file

## 2022-07-12 RX ORDER — RIZATRIPTAN BENZOATE 5 MG/1
5 TABLET, ORALLY DISINTEGRATING ORAL ONCE
Qty: 15 TABLET | Refills: 0 | Status: SHIPPED | OUTPATIENT
Start: 2022-07-12 | End: 2022-07-12

## 2022-08-29 DIAGNOSIS — F41.9 ANXIETY: ICD-10-CM

## 2022-08-29 RX ORDER — VENLAFAXINE HYDROCHLORIDE 75 MG/1
75 CAPSULE, EXTENDED RELEASE ORAL DAILY
Qty: 90 CAPSULE | Refills: 0 | Status: SHIPPED | OUTPATIENT
Start: 2022-08-29

## 2022-10-28 DIAGNOSIS — F51.01 PRIMARY INSOMNIA: ICD-10-CM

## 2022-10-28 DIAGNOSIS — F41.9 ANXIETY: ICD-10-CM

## 2022-10-28 RX ORDER — TRAZODONE HYDROCHLORIDE 50 MG/1
25 TABLET ORAL
Qty: 90 TABLET | Refills: 1 | Status: SHIPPED | OUTPATIENT
Start: 2022-10-28

## 2022-10-28 NOTE — TELEPHONE ENCOUNTER
Future Appointments:  No future appointments. Last Appointment With Me:  5/19/2022     Requested Prescriptions     Pending Prescriptions Disp Refills    traZODone (DESYREL) 50 mg tablet 90 Tablet 1     Sig: Take 0.5 Tablets by mouth nightly.

## 2022-11-28 DIAGNOSIS — F41.9 ANXIETY: ICD-10-CM

## 2022-11-28 RX ORDER — VENLAFAXINE HYDROCHLORIDE 37.5 MG/1
37.5 CAPSULE, EXTENDED RELEASE ORAL DAILY
Qty: 90 CAPSULE | Refills: 0 | Status: SHIPPED | OUTPATIENT
Start: 2022-11-28

## 2022-11-28 NOTE — TELEPHONE ENCOUNTER
Future Appointments:  No future appointments. Last Appointment With Me:  Visit date not found     Requested Prescriptions     Pending Prescriptions Disp Refills    venlafaxine-SR (EFFEXOR-XR) 37.5 mg capsule 90 Capsule 0     Sig: Take 1 Capsule by mouth daily.

## 2022-12-19 DIAGNOSIS — F51.01 PRIMARY INSOMNIA: ICD-10-CM

## 2022-12-19 DIAGNOSIS — F41.9 ANXIETY: ICD-10-CM

## 2022-12-19 RX ORDER — TRAZODONE HYDROCHLORIDE 50 MG/1
25 TABLET ORAL
Qty: 90 TABLET | Refills: 1 | Status: SHIPPED | OUTPATIENT
Start: 2022-12-19

## 2022-12-19 NOTE — TELEPHONE ENCOUNTER
Future Appointments:  Future Appointments   Date Time Provider Tj Medranoi   12/29/2022  1:15 PM Appa Luigi Pires MD [facility] BS AMB        Last Appointment With Me:  Visit date not found     Requested Prescriptions     Pending Prescriptions Disp Refills    traZODone (DESYREL) 50 mg tablet 90 Tablet 1     Sig: Take 0.5 Tablets by mouth nightly.

## 2022-12-29 ENCOUNTER — VIRTUAL VISIT (OUTPATIENT)
Dept: INTERNAL MEDICINE CLINIC | Age: 32
End: 2022-12-29
Payer: COMMERCIAL

## 2022-12-29 DIAGNOSIS — U07.1 COVID-19: ICD-10-CM

## 2022-12-29 DIAGNOSIS — F41.9 ANXIETY: Primary | ICD-10-CM

## 2022-12-29 DIAGNOSIS — R53.82 CHRONIC FATIGUE: ICD-10-CM

## 2022-12-29 PROCEDURE — 99214 OFFICE O/P EST MOD 30 MIN: CPT | Performed by: INTERNAL MEDICINE

## 2022-12-29 RX ORDER — DULOXETIN HYDROCHLORIDE 30 MG/1
30 CAPSULE, DELAYED RELEASE ORAL DAILY
Qty: 90 CAPSULE | Refills: 1 | Status: SHIPPED | OUTPATIENT
Start: 2022-12-29

## 2022-12-29 RX ORDER — ONDANSETRON 4 MG/1
4 TABLET, ORALLY DISINTEGRATING ORAL
Qty: 10 TABLET | Refills: 0 | Status: SHIPPED | OUTPATIENT
Start: 2022-12-29

## 2022-12-29 NOTE — PROGRESS NOTES
CC: Medication Evaluation (Effexor ) and Positive For Covid-19      HPI:    She is a 34 y.o. female who presents for evaluation of depression COVID 23    Started to feel unwell last Thursday and tested positive for COVID 19 last Monday - she has lingering cough congestion and HA    Has been on multiple SSRIs which work well for a few months then stop working and then changed to [employer]  around March 2022 and struggles with side effects when takes the medication late   Currently on effexor 37.5mg daily         This is an established visit conducted via telemedicine with video. The patient has been instructed that this meets HIPAA criteria and acknowledges and agrees to this method of visitation. Pursuant to the emergency declaration under the [facility], 1135 waiver authority and the [employer] Act, this Virtual Visit was conducted, with patient's consent, to reduce the patient's risk of exposure to COVID-19 and provide continuity of care for an established patient. Services were provided through a video synchronous discussion virtually to substitute for in-person clinic visit. ROS:  Constitutional: negative for fevers, chills, anorexia and weight loss  10 systems reviewed and negative other then HPI     Past Medical History:   Diagnosis Date    Anxiety     Contact dermatitis and other eczema, due to unspecified cause     angular chelitis    Psychiatric problem     anxiety - takes Zoloft 50 mg       Current Outpatient Medications on File Prior to Visit   Medication Sig Dispense Refill    traZODone (DESYREL) 50 mg tablet Take 0.5 Tablets by mouth nightly. 90 Tablet 1    venlafaxine-SR (EFFEXOR-XR) 37.5 mg capsule Take 1 Capsule by mouth daily. 90 Capsule 0    venlafaxine-SR (EFFEXOR-XR) 75 mg capsule TAKE 1 CAPSULE BY MOUTH DAILY. 90 Capsule 0    cholecalciferol, vitamin D3, 50 mcg (2,000 unit) tab Take  by mouth. ergocalciferol (ERGOCALCIFEROL) 1,250 mcg (50,000 unit) capsule Take 1 Capsule by mouth every seven (7) days. 12 Capsule 3    LEVORA-28 0.15-0.03 mg tab TAKE ONE TABLET BY MOUTH EVERY DAY  2     No current facility-administered medications on file prior to visit. Past Surgical History:   Procedure Laterality Date    HX GYN  10/30/2015    NVD     HX OTHER SURGICAL      wisdom teeth removed       Family History   Problem Relation Age of Onset    No Known Problems Mother     No Known Problems Father     Other Sister         protein C deficiency    Heart Disease Maternal Grandfather     Cancer Paternal Grandmother         lymphoma     Reviewed and no changes     Social History     Socioeconomic History    Marital status:      Spouse name: Not on file    Number of children: Not on file    Years of education: Not on file    Highest education level: Not on file   Occupational History    Not on file   Tobacco Use    Smoking status: Never    Smokeless tobacco: Never   Substance and Sexual Activity    Alcohol use: Yes     Comment: Socially    Drug use: No    Sexual activity: Yes     Partners: Male     Birth control/protection: None, Pill   Other Topics Concern    Not on file   Social History Narrative    Not on file     Social Determinants of Health     Financial Resource Strain: Not on file   Food Insecurity: Not on file   Transportation Needs: Not on file   Physical Activity: Not on file   Stress: Not on file   Social Connections: Not on file   Intimate Partner Violence: Not on file   Housing Stability: Not on file          There were no vitals taken for this visit. Physical Examination:   Gen: well appearing female  HEENT: normal conjunctiva, no audible congestion, patient does not see oral erythema, has MMM  Neck: patient does not feel enlarged or tender LAD or masses  Resp: normal respiratory effort, no audible wheezing.    CV: patient does not feel palpitations or heart irregularity  Abd: patient does not feel abdominal tenderness or mass, patient does not notice distension  Extrem: patient does not see swelling in ankles or joints. Neuro: Alert and oriented, able to answer questions without difficulty, able to move all extremities and walk normally          Lab Results   Component Value Date/Time    WBC 5.3 12/15/2021 08:04 AM    HGB 13.6 12/15/2021 08:04 AM    HCT 43.5 12/15/2021 08:04 AM    PLATELET 535 79/34/7027 08:04 AM    MCV 92.9 12/15/2021 08:04 AM     Lab Results   Component Value Date/Time    Sodium 140 12/15/2021 08:04 AM    Potassium 4.8 12/15/2021 08:04 AM    Chloride 109 (H) 12/15/2021 08:04 AM    CO2 25 12/15/2021 08:04 AM    Anion gap 6 12/15/2021 08:04 AM    Glucose 88 12/15/2021 08:04 AM    BUN 11 12/15/2021 08:04 AM    Creatinine 0.69 12/15/2021 08:04 AM    BUN/Creatinine ratio 16 12/15/2021 08:04 AM    GFR est AA >60 12/15/2021 08:04 AM    GFR est non-AA >60 12/15/2021 08:04 AM    Calcium 8.9 12/15/2021 08:04 AM     Lab Results   Component Value Date/Time    Cholesterol, total 222 (H) 12/15/2021 08:04 AM    HDL Cholesterol 73 12/15/2021 08:04 AM    LDL, calculated 135.2 (H) 12/15/2021 08:04 AM    VLDL, calculated 13.8 12/15/2021 08:04 AM    Triglyceride 69 12/15/2021 08:04 AM    CHOL/HDL Ratio 3.0 12/15/2021 08:04 AM     Lab Results   Component Value Date/Time    TSH 1.34 12/15/2021 08:04 AM     No results found for: PSA, Yani Kline, [id number], [id number]  Lab Results   Component Value Date/Time    Hemoglobin A1c 5.2 12/15/2021 08:04 AM     Lab Results   Component Value Date/Time    Vitamin D 25-Hydroxy 19.1 (L) 12/15/2021 08:04 AM       Lab Results   Component Value Date/Time    ALT (SGPT) 37 12/15/2021 08:04 AM    Alk. phosphatase 78 12/15/2021 08:04 AM    Bilirubin, total 0.4 12/15/2021 08:04 AM           Assessment/Plan:    1. Anxiety  - having side effects with effexor change to duloxetine   - DULoxetine (CYMBALTA) 30 mg capsule;  Take 1 Capsule by mouth daily.  Dispense: 90 Capsule; Refill: 1    2. Chronic fatigue    3. COVID-19  - outside of window for paxlovid supportive care         Ruby Ramey MD    This is an established visit conducted via real time video and audio telemedicine. The patient has been instructed that this meets HIPAA criteria and acknowledges and agrees to this method of visitation.

## 2022-12-29 NOTE — PROGRESS NOTES
1. \"Have you been to the ER, urgent care clinic since your last visit? Hospitalized since your last visit? \" No    2. \"Have you seen or consulted any other health care providers outside of the [address] [place] [address] since your last visit? \" No     3. For patients aged 39-70: Has the patient had a colonoscopy / FIT/ Cologuard? NA - based on age      If the patient is female:    4. For patients aged 41-77: Has the patient had a mammogram within the past 2 years? NA - based on age or sex      11. For patients aged 21-65: Has the patient had a pap smear? Yes - Care Gap present.  Most recent result on file

## 2023-05-23 RX ORDER — ONDANSETRON 4 MG/1
4 TABLET, ORALLY DISINTEGRATING ORAL EVERY 8 HOURS PRN
COMMUNITY
Start: 2022-12-29

## 2023-05-23 RX ORDER — TRAZODONE HYDROCHLORIDE 50 MG/1
25 TABLET ORAL
COMMUNITY
Start: 2022-12-19

## 2023-05-23 RX ORDER — LEVONORGESTREL AND ETHINYL ESTRADIOL 0.15-0.03
1 KIT ORAL DAILY
COMMUNITY
Start: 2016-12-12

## 2023-05-23 RX ORDER — DULOXETIN HYDROCHLORIDE 30 MG/1
30 CAPSULE, DELAYED RELEASE ORAL DAILY
COMMUNITY
Start: 2022-12-29 | End: 2023-05-24 | Stop reason: DRUGHIGH

## 2023-05-23 RX ORDER — ERGOCALCIFEROL 1.25 MG/1
50000 CAPSULE ORAL
COMMUNITY
Start: 2021-12-27

## 2023-05-24 ENCOUNTER — PATIENT MESSAGE (OUTPATIENT)
Age: 33
End: 2023-05-24

## 2023-05-24 DIAGNOSIS — F41.1 GENERALIZED ANXIETY DISORDER: Primary | ICD-10-CM

## 2023-05-24 RX ORDER — DULOXETIN HYDROCHLORIDE 60 MG/1
60 CAPSULE, DELAYED RELEASE ORAL DAILY
Qty: 30 CAPSULE | Refills: 3 | Status: SHIPPED | OUTPATIENT
Start: 2023-05-24

## 2023-05-25 ENCOUNTER — TELEPHONE (OUTPATIENT)
Age: 33
End: 2023-05-25

## 2023-05-25 ENCOUNTER — CLINICAL DOCUMENTATION (OUTPATIENT)
Age: 33
End: 2023-05-25

## 2023-05-25 NOTE — PROGRESS NOTES
Called patient to confirm pharmacy, no answer, left message for patient to give us a call back.     Saeed Sutton LPN

## 2023-05-25 NOTE — TELEPHONE ENCOUNTER
Lissa Rubalcava 5/24/2023 1:48 PM EDT      ----- Message -----  From: Alexandra Locke  Sent: 5/24/2023 1:16 PM EDT  To: , *  Subject: Cymbalta     Hi Dr. Boston Culver! I have been taking the Cymbalta 30mg for about 5 months now and am noticing that it isn't working as well as it once was. I am feeling pretty irritable and anxious more often and in more intensity. I wanted to check with you and see if I can increase the dose to the next recommended amount?

## 2023-08-04 RX ORDER — TRAZODONE HYDROCHLORIDE 50 MG/1
TABLET ORAL
Qty: 90 TABLET | Refills: 1 | Status: SHIPPED | OUTPATIENT
Start: 2023-08-04

## 2023-09-26 DIAGNOSIS — F41.1 GENERALIZED ANXIETY DISORDER: ICD-10-CM

## 2023-09-26 RX ORDER — DULOXETIN HYDROCHLORIDE 60 MG/1
60 CAPSULE, DELAYED RELEASE ORAL DAILY
Qty: 30 CAPSULE | Refills: 3 | Status: SHIPPED | OUTPATIENT
Start: 2023-09-26

## 2023-10-10 ENCOUNTER — OFFICE VISIT (OUTPATIENT)
Age: 33
End: 2023-10-10
Payer: COMMERCIAL

## 2023-10-10 VITALS
DIASTOLIC BLOOD PRESSURE: 70 MMHG | WEIGHT: 208 LBS | HEIGHT: 68 IN | BODY MASS INDEX: 31.52 KG/M2 | RESPIRATION RATE: 20 BRPM | OXYGEN SATURATION: 99 % | HEART RATE: 92 BPM | TEMPERATURE: 97 F | SYSTOLIC BLOOD PRESSURE: 102 MMHG

## 2023-10-10 DIAGNOSIS — Z13.1 SCREENING FOR DIABETES MELLITUS: ICD-10-CM

## 2023-10-10 DIAGNOSIS — E55.9 VITAMIN D DEFICIENCY, UNSPECIFIED: ICD-10-CM

## 2023-10-10 DIAGNOSIS — E78.00 HIGH CHOLESTEROL: ICD-10-CM

## 2023-10-10 DIAGNOSIS — F41.1 GENERALIZED ANXIETY DISORDER: Primary | ICD-10-CM

## 2023-10-10 LAB
25(OH)D3 SERPL-MCNC: 22.4 NG/ML (ref 30–100)
ALBUMIN SERPL-MCNC: 3.7 G/DL (ref 3.5–5)
ALBUMIN/GLOB SERPL: 1.1 (ref 1.1–2.2)
ALP SERPL-CCNC: 66 U/L (ref 45–117)
ALT SERPL-CCNC: 40 U/L (ref 12–78)
ANION GAP SERPL CALC-SCNC: 5 MMOL/L (ref 5–15)
AST SERPL-CCNC: 30 U/L (ref 15–37)
BASOPHILS # BLD: 0 K/UL (ref 0–0.1)
BASOPHILS NFR BLD: 1 % (ref 0–1)
BILIRUB SERPL-MCNC: 0.4 MG/DL (ref 0.2–1)
BUN SERPL-MCNC: 8 MG/DL (ref 6–20)
BUN/CREAT SERPL: 11 (ref 12–20)
CALCIUM SERPL-MCNC: 9.1 MG/DL (ref 8.5–10.1)
CHLORIDE SERPL-SCNC: 105 MMOL/L (ref 97–108)
CHOLEST SERPL-MCNC: 191 MG/DL
CO2 SERPL-SCNC: 28 MMOL/L (ref 21–32)
CREAT SERPL-MCNC: 0.71 MG/DL (ref 0.55–1.02)
DIFFERENTIAL METHOD BLD: NORMAL
EOSINOPHIL # BLD: 0 K/UL (ref 0–0.4)
EOSINOPHIL NFR BLD: 1 % (ref 0–7)
ERYTHROCYTE [DISTWIDTH] IN BLOOD BY AUTOMATED COUNT: 11.9 % (ref 11.5–14.5)
EST. AVERAGE GLUCOSE BLD GHB EST-MCNC: 97 MG/DL
GLOBULIN SER CALC-MCNC: 3.5 G/DL (ref 2–4)
GLUCOSE SERPL-MCNC: 83 MG/DL (ref 65–100)
HBA1C MFR BLD: 5 % (ref 4–5.6)
HCT VFR BLD AUTO: 41.9 % (ref 35–47)
HDLC SERPL-MCNC: 66 MG/DL
HDLC SERPL: 2.9 (ref 0–5)
HGB BLD-MCNC: 13.7 G/DL (ref 11.5–16)
IMM GRANULOCYTES # BLD AUTO: 0 K/UL (ref 0–0.04)
IMM GRANULOCYTES NFR BLD AUTO: 0 % (ref 0–0.5)
LDLC SERPL CALC-MCNC: 105.6 MG/DL (ref 0–100)
LYMPHOCYTES # BLD: 1.5 K/UL (ref 0.8–3.5)
LYMPHOCYTES NFR BLD: 30 % (ref 12–49)
MCH RBC QN AUTO: 29.1 PG (ref 26–34)
MCHC RBC AUTO-ENTMCNC: 32.7 G/DL (ref 30–36.5)
MCV RBC AUTO: 89.1 FL (ref 80–99)
MONOCYTES # BLD: 0.4 K/UL (ref 0–1)
MONOCYTES NFR BLD: 7 % (ref 5–13)
NEUTS SEG # BLD: 3 K/UL (ref 1.8–8)
NEUTS SEG NFR BLD: 61 % (ref 32–75)
NRBC # BLD: 0 K/UL (ref 0–0.01)
NRBC BLD-RTO: 0 PER 100 WBC
PLATELET # BLD AUTO: 204 K/UL (ref 150–400)
PMV BLD AUTO: 9.6 FL (ref 8.9–12.9)
POTASSIUM SERPL-SCNC: 4.4 MMOL/L (ref 3.5–5.1)
PROT SERPL-MCNC: 7.2 G/DL (ref 6.4–8.2)
RBC # BLD AUTO: 4.7 M/UL (ref 3.8–5.2)
SODIUM SERPL-SCNC: 138 MMOL/L (ref 136–145)
TRIGL SERPL-MCNC: 97 MG/DL
VLDLC SERPL CALC-MCNC: 19.4 MG/DL
WBC # BLD AUTO: 4.9 K/UL (ref 3.6–11)

## 2023-10-10 PROCEDURE — 99214 OFFICE O/P EST MOD 30 MIN: CPT | Performed by: INTERNAL MEDICINE

## 2023-10-10 SDOH — ECONOMIC STABILITY: FOOD INSECURITY: WITHIN THE PAST 12 MONTHS, THE FOOD YOU BOUGHT JUST DIDN'T LAST AND YOU DIDN'T HAVE MONEY TO GET MORE.: NEVER TRUE

## 2023-10-10 SDOH — ECONOMIC STABILITY: INCOME INSECURITY: HOW HARD IS IT FOR YOU TO PAY FOR THE VERY BASICS LIKE FOOD, HOUSING, MEDICAL CARE, AND HEATING?: NOT HARD AT ALL

## 2023-10-10 SDOH — ECONOMIC STABILITY: HOUSING INSECURITY
IN THE LAST 12 MONTHS, WAS THERE A TIME WHEN YOU DID NOT HAVE A STEADY PLACE TO SLEEP OR SLEPT IN A SHELTER (INCLUDING NOW)?: NO

## 2023-10-10 SDOH — ECONOMIC STABILITY: FOOD INSECURITY: WITHIN THE PAST 12 MONTHS, YOU WORRIED THAT YOUR FOOD WOULD RUN OUT BEFORE YOU GOT MONEY TO BUY MORE.: NEVER TRUE

## 2023-10-10 ASSESSMENT — PATIENT HEALTH QUESTIONNAIRE - PHQ9
2. FEELING DOWN, DEPRESSED OR HOPELESS: 0
SUM OF ALL RESPONSES TO PHQ QUESTIONS 1-9: 0
1. LITTLE INTEREST OR PLEASURE IN DOING THINGS: 0
SUM OF ALL RESPONSES TO PHQ QUESTIONS 1-9: 0
SUM OF ALL RESPONSES TO PHQ9 QUESTIONS 1 & 2: 0

## 2024-01-12 RX ORDER — TRAZODONE HYDROCHLORIDE 50 MG/1
TABLET ORAL
Qty: 90 TABLET | Refills: 1 | Status: SHIPPED | OUTPATIENT
Start: 2024-01-12

## 2024-02-02 DIAGNOSIS — F41.1 GENERALIZED ANXIETY DISORDER: ICD-10-CM

## 2024-02-02 RX ORDER — DULOXETIN HYDROCHLORIDE 60 MG/1
60 CAPSULE, DELAYED RELEASE ORAL DAILY
Qty: 30 CAPSULE | Refills: 3 | Status: SHIPPED | OUTPATIENT
Start: 2024-02-02

## 2024-04-01 RX ORDER — TRAZODONE HYDROCHLORIDE 50 MG/1
TABLET ORAL
Qty: 90 TABLET | Refills: 1 | Status: SHIPPED | OUTPATIENT
Start: 2024-04-01

## 2024-05-24 DIAGNOSIS — F41.1 GENERALIZED ANXIETY DISORDER: ICD-10-CM

## 2024-05-24 RX ORDER — DULOXETIN HYDROCHLORIDE 60 MG/1
60 CAPSULE, DELAYED RELEASE ORAL DAILY
Qty: 30 CAPSULE | Refills: 3 | Status: SHIPPED | OUTPATIENT
Start: 2024-05-24

## 2024-09-11 DIAGNOSIS — F41.1 GENERALIZED ANXIETY DISORDER: ICD-10-CM

## 2024-09-11 RX ORDER — DULOXETIN HYDROCHLORIDE 60 MG/1
60 CAPSULE, DELAYED RELEASE ORAL DAILY
Qty: 30 CAPSULE | Refills: 3 | Status: SHIPPED | OUTPATIENT
Start: 2024-09-11

## 2024-10-10 ENCOUNTER — TELEPHONE (OUTPATIENT)
Age: 34
End: 2024-10-10

## 2024-10-10 NOTE — TELEPHONE ENCOUNTER
----- Message from Art D sent at 10/10/2024  2:02 PM EDT -----  Regarding: [facility] Appointment Request  [facility] Appointment Request    Patient needs appointment for [facility] Appointment Type: Existing Condition Follow Up.    Patient Requested Dates(s):after 1st November  Patient Requested Time:afternoon  Provider Name: Codi Kruger MD    Reason for Appointment Request: Other patient wants to reschedule her appt on the 10/17/2024 + patient has an issue with her insurance if possible --------------------------------------------------------------------------------------------------------------------------    Relationship to Patient: Self     Call Back Information: OK to leave message on voicemail  Preferred Call Back Number: Phone [phone]

## 2024-11-05 ENCOUNTER — OFFICE VISIT (OUTPATIENT)
Age: 34
End: 2024-11-05
Payer: COMMERCIAL

## 2024-11-05 VITALS
HEIGHT: 68 IN | RESPIRATION RATE: 20 BRPM | TEMPERATURE: 97.6 F | WEIGHT: 206.8 LBS | OXYGEN SATURATION: 98 % | SYSTOLIC BLOOD PRESSURE: 114 MMHG | DIASTOLIC BLOOD PRESSURE: 82 MMHG | BODY MASS INDEX: 31.34 KG/M2 | HEART RATE: 85 BPM

## 2024-11-05 DIAGNOSIS — F41.1 GENERALIZED ANXIETY DISORDER: ICD-10-CM

## 2024-11-05 DIAGNOSIS — E55.9 VITAMIN D DEFICIENCY, UNSPECIFIED: Primary | ICD-10-CM

## 2024-11-05 DIAGNOSIS — E66.9 OBESITY (BMI 30-39.9): ICD-10-CM

## 2024-11-05 PROCEDURE — 1036F TOBACCO NON-USER: CPT | Performed by: INTERNAL MEDICINE

## 2024-11-05 PROCEDURE — G8417 CALC BMI ABV UP PARAM F/U: HCPCS | Performed by: INTERNAL MEDICINE

## 2024-11-05 PROCEDURE — 99214 OFFICE O/P EST MOD 30 MIN: CPT | Performed by: INTERNAL MEDICINE

## 2024-11-05 PROCEDURE — G8427 DOCREV CUR MEDS BY ELIG CLIN: HCPCS | Performed by: INTERNAL MEDICINE

## 2024-11-05 PROCEDURE — G8484 FLU IMMUNIZE NO ADMIN: HCPCS | Performed by: INTERNAL MEDICINE

## 2024-11-05 RX ORDER — DULOXETIN HYDROCHLORIDE 60 MG/1
60 CAPSULE, DELAYED RELEASE ORAL DAILY
Qty: 30 CAPSULE | Refills: 3 | Status: SHIPPED | OUTPATIENT
Start: 2024-11-05

## 2024-11-05 RX ORDER — PHENTERMINE HYDROCHLORIDE 37.5 MG/1
37.5 TABLET ORAL
Qty: 30 TABLET | Refills: 2 | Status: SHIPPED | OUTPATIENT
Start: 2024-11-05 | End: 2025-02-03

## 2024-11-05 RX ORDER — ERGOCALCIFEROL 1.25 MG/1
50000 CAPSULE ORAL
Qty: 4 CAPSULE | Refills: 3 | Status: SHIPPED | OUTPATIENT
Start: 2024-11-05

## 2024-11-05 SDOH — ECONOMIC STABILITY: FOOD INSECURITY: WITHIN THE PAST 12 MONTHS, THE FOOD YOU BOUGHT JUST DIDN'T LAST AND YOU DIDN'T HAVE MONEY TO GET MORE.: NEVER TRUE

## 2024-11-05 SDOH — ECONOMIC STABILITY: FOOD INSECURITY: WITHIN THE PAST 12 MONTHS, YOU WORRIED THAT YOUR FOOD WOULD RUN OUT BEFORE YOU GOT MONEY TO BUY MORE.: NEVER TRUE

## 2024-11-05 SDOH — ECONOMIC STABILITY: INCOME INSECURITY: HOW HARD IS IT FOR YOU TO PAY FOR THE VERY BASICS LIKE FOOD, HOUSING, MEDICAL CARE, AND HEATING?: NOT HARD AT ALL

## 2024-11-05 ASSESSMENT — PATIENT HEALTH QUESTIONNAIRE - PHQ9
1. LITTLE INTEREST OR PLEASURE IN DOING THINGS: NOT AT ALL
SUM OF ALL RESPONSES TO PHQ9 QUESTIONS 1 & 2: 1
SUM OF ALL RESPONSES TO PHQ QUESTIONS 1-9: 1
2. FEELING DOWN, DEPRESSED OR HOPELESS: SEVERAL DAYS
SUM OF ALL RESPONSES TO PHQ QUESTIONS 1-9: 1

## 2024-11-05 NOTE — PROGRESS NOTES
Nyla Easley (:  1990) is a 34 y.o. female, Established patient, here for evaluation of the following chief complaint(s):  Medication Check and Weight Management         Assessment & Plan  1. Anxiety.  She has been experiencing symptoms of anxiety, including feeling overwhelmed and tired despite adequate sleep. She has been on Cymbalta since  but is considering a change due to side effects such as low libido and constipation However she has failed effexor, wellbutrin + multiple different SSRIs  I advised her to continue with cymbalta for now and work on weight management    2. Weight management.  She has been struggling with weight loss despite a healthy diet and regular exercise. Phentermine was suggested as a potential treatment option to aid in weight loss. She was advised to start with half a pill to assess her tolerance. Potential side effects of phentermine, including increased heart rate and constipation, were discussed. She was also advised to take stool softeners, MiraLAX, and fiber to manage constipation.     3. Vitamin D deficiency.  A prescription for high-dose vitamin D to be taken once a week was refilled.        Results    1. Vitamin D deficiency, unspecified  -     ergocalciferol (ERGOCALCIFEROL) 1.25 MG (19023 UT) capsule; Take 1 capsule by mouth every 7 days, Disp-4 capsule, R-3Normal  2. Generalized anxiety disorder  -     DULoxetine (CYMBALTA) 60 MG extended release capsule; Take 1 capsule by mouth daily, Disp-30 capsule, R-3This prescription was filled on 2024. Any refills authorized will be placed on file.Normal  3. Obesity (BMI 30-39.9)  -     phentermine (ADIPEX-P) 37.5 MG tablet; Take 1 tablet by mouth every morning (before breakfast) for 90 days. Max Daily Amount: 37.5 mg, Disp-30 tablet, R-2Normal    Return in about 3 months (around 2025).       Subjective   History of Present Illness  The patient presents for evaluation of anxiety and weight management.    She

## 2024-11-05 NOTE — PROGRESS NOTES
\"Have you been to the ER, urgent care clinic since your last visit?  Hospitalized since your last visit?\"    NO    “Have you seen or consulted any other health care providers outside our system since your last visit?”    NO     “Have you had a pap smear?”    Yes, Dr. davis    No cervical cancer screening on file

## 2025-01-13 RX ORDER — TRAZODONE HYDROCHLORIDE 50 MG/1
TABLET ORAL
Qty: 90 TABLET | Refills: 1 | OUTPATIENT
Start: 2025-01-13

## 2025-01-25 DIAGNOSIS — F41.1 GENERALIZED ANXIETY DISORDER: ICD-10-CM

## 2025-01-25 DIAGNOSIS — E66.9 OBESITY (BMI 30-39.9): ICD-10-CM

## 2025-01-27 RX ORDER — PHENTERMINE HYDROCHLORIDE 37.5 MG/1
TABLET ORAL
Qty: 30 TABLET | Refills: 2 | Status: SHIPPED | OUTPATIENT
Start: 2025-01-27 | End: 2025-04-27

## 2025-01-27 RX ORDER — DULOXETIN HYDROCHLORIDE 60 MG/1
60 CAPSULE, DELAYED RELEASE ORAL DAILY
Qty: 30 CAPSULE | Refills: 3 | Status: SHIPPED | OUTPATIENT
Start: 2025-01-27

## 2025-02-12 RX ORDER — TRAZODONE HYDROCHLORIDE 50 MG/1
TABLET, FILM COATED ORAL
Qty: 90 TABLET | Refills: 1 | Status: SHIPPED | OUTPATIENT
Start: 2025-02-12 | End: 2025-02-12 | Stop reason: SDUPTHER

## 2025-02-12 NOTE — TELEPHONE ENCOUNTER
PCP: Codi Kruger MD    Last appt: 11/5/2024  No future appointments.    Requested Prescriptions     Pending Prescriptions Disp Refills    traZODone (DESYREL) 50 MG tablet [Pharmacy Med Name: trazodone 50 mg tablet] 90 tablet 1     Sig: TAKE 1/2 TABLET BY MOUTH EVERY NIGHT

## 2025-02-13 RX ORDER — TRAZODONE HYDROCHLORIDE 50 MG/1
50 TABLET, FILM COATED ORAL NIGHTLY PRN
Qty: 90 TABLET | Refills: 1 | Status: SHIPPED | OUTPATIENT
Start: 2025-02-13

## 2025-05-27 DIAGNOSIS — E66.9 OBESITY (BMI 30-39.9): ICD-10-CM

## 2025-05-27 RX ORDER — PHENTERMINE HYDROCHLORIDE 37.5 MG/1
TABLET ORAL
Qty: 30 TABLET | Refills: 2 | OUTPATIENT
Start: 2025-05-27 | End: 2025-08-25

## 2025-05-28 DIAGNOSIS — F41.1 GENERALIZED ANXIETY DISORDER: ICD-10-CM

## 2025-05-28 RX ORDER — DULOXETIN HYDROCHLORIDE 60 MG/1
60 CAPSULE, DELAYED RELEASE ORAL DAILY
Qty: 30 CAPSULE | Refills: 5 | Status: SHIPPED | OUTPATIENT
Start: 2025-05-28

## 2025-06-02 NOTE — TELEPHONE ENCOUNTER
LM(generic)   Pt to schedule sooner appt - around July since pt should have insurance by then for meds to be refilled until appt, then can schedule annual further out.